# Patient Record
Sex: FEMALE | Race: WHITE | Employment: UNEMPLOYED | URBAN - METROPOLITAN AREA
[De-identification: names, ages, dates, MRNs, and addresses within clinical notes are randomized per-mention and may not be internally consistent; named-entity substitution may affect disease eponyms.]

---

## 2018-04-20 ENCOUNTER — HOSPITAL ENCOUNTER (OUTPATIENT)
Dept: ULTRASOUND IMAGING | Age: 53
Discharge: HOME | End: 2018-04-20
Attending: NURSE PRACTITIONER
Payer: COMMERCIAL

## 2018-04-20 DIAGNOSIS — E03.1 CONGENITAL HYPOTHYROIDISM WITHOUT GOITER: ICD-10-CM

## 2018-04-20 PROCEDURE — 76536 US EXAM OF HEAD AND NECK: CPT

## 2018-04-30 NOTE — PROGRESS NOTES
Patient having throat pain. Will refer to endocrinologist.  Already on levothyroxine and numbers good.   [facility]

## 2018-05-25 PROBLEM — E04.2 MULTINODULAR GOITER: Status: ACTIVE | Noted: 2018-05-25

## 2020-04-30 ENCOUNTER — ANESTHESIA EVENT (OUTPATIENT)
Dept: ENDOSCOPY | Age: 55
End: 2020-04-30
Payer: COMMERCIAL

## 2020-04-30 RX ORDER — SODIUM CHLORIDE 0.9 % (FLUSH) 0.9 %
5-40 SYRINGE (ML) INJECTION EVERY 8 HOURS
Status: CANCELLED | OUTPATIENT
Start: 2020-04-30

## 2020-04-30 RX ORDER — SODIUM CHLORIDE 0.9 % (FLUSH) 0.9 %
5-40 SYRINGE (ML) INJECTION AS NEEDED
Status: CANCELLED | OUTPATIENT
Start: 2020-04-30

## 2020-04-30 RX ORDER — SODIUM CHLORIDE, SODIUM LACTATE, POTASSIUM CHLORIDE, CALCIUM CHLORIDE 600; 310; 30; 20 MG/100ML; MG/100ML; MG/100ML; MG/100ML
100 INJECTION, SOLUTION INTRAVENOUS CONTINUOUS
Status: CANCELLED | OUTPATIENT
Start: 2020-04-30

## 2020-05-01 ENCOUNTER — HOSPITAL ENCOUNTER (OUTPATIENT)
Age: 55
Setting detail: OUTPATIENT SURGERY
Discharge: HOME | End: 2020-05-01
Attending: INTERNAL MEDICINE | Admitting: INTERNAL MEDICINE
Payer: COMMERCIAL

## 2020-05-01 ENCOUNTER — ANESTHESIA (OUTPATIENT)
Dept: ENDOSCOPY | Age: 55
End: 2020-05-01
Payer: COMMERCIAL

## 2020-05-01 VITALS
HEART RATE: 61 BPM | DIASTOLIC BLOOD PRESSURE: 79 MMHG | TEMPERATURE: 98.6 F | SYSTOLIC BLOOD PRESSURE: 130 MMHG | OXYGEN SATURATION: 98 % | RESPIRATION RATE: 18 BRPM

## 2020-05-01 LAB — GLUCOSE BLD STRIP.AUTO-MCNC: 101 MG/DL (ref 65–100)

## 2020-05-01 PROCEDURE — 74011250636 HC RX REV CODE- 250/636: Performed by: ANESTHESIOLOGY

## 2020-05-01 PROCEDURE — 76040000025: Performed by: INTERNAL MEDICINE

## 2020-05-01 PROCEDURE — 74011000250 HC RX REV CODE- 250: Performed by: NURSE ANESTHETIST, CERTIFIED REGISTERED

## 2020-05-01 PROCEDURE — 77030021593 HC FCPS BIOP ENDOSC BSC -A: Performed by: INTERNAL MEDICINE

## 2020-05-01 PROCEDURE — 74011250636 HC RX REV CODE- 250/636: Performed by: NURSE ANESTHETIST, CERTIFIED REGISTERED

## 2020-05-01 PROCEDURE — 88305 TISSUE EXAM BY PATHOLOGIST: CPT

## 2020-05-01 PROCEDURE — 82962 GLUCOSE BLOOD TEST: CPT

## 2020-05-01 PROCEDURE — 76060000032 HC ANESTHESIA 0.5 TO 1 HR: Performed by: INTERNAL MEDICINE

## 2020-05-01 RX ORDER — PROPOFOL 10 MG/ML
INJECTION, EMULSION INTRAVENOUS
Status: DISCONTINUED | OUTPATIENT
Start: 2020-05-01 | End: 2020-05-01 | Stop reason: HOSPADM

## 2020-05-01 RX ORDER — LIDOCAINE HYDROCHLORIDE 20 MG/ML
INJECTION, SOLUTION EPIDURAL; INFILTRATION; INTRACAUDAL; PERINEURAL AS NEEDED
Status: DISCONTINUED | OUTPATIENT
Start: 2020-05-01 | End: 2020-05-01 | Stop reason: HOSPADM

## 2020-05-01 RX ORDER — PROPOFOL 10 MG/ML
INJECTION, EMULSION INTRAVENOUS AS NEEDED
Status: DISCONTINUED | OUTPATIENT
Start: 2020-05-01 | End: 2020-05-01 | Stop reason: HOSPADM

## 2020-05-01 RX ORDER — SODIUM CHLORIDE 9 MG/ML
10 INJECTION, SOLUTION INTRAVENOUS CONTINUOUS
Status: DISCONTINUED | OUTPATIENT
Start: 2020-05-01 | End: 2020-05-01 | Stop reason: HOSPADM

## 2020-05-01 RX ORDER — SODIUM CHLORIDE, SODIUM LACTATE, POTASSIUM CHLORIDE, CALCIUM CHLORIDE 600; 310; 30; 20 MG/100ML; MG/100ML; MG/100ML; MG/100ML
100 INJECTION, SOLUTION INTRAVENOUS CONTINUOUS
Status: DISCONTINUED | OUTPATIENT
Start: 2020-05-01 | End: 2020-05-01 | Stop reason: HOSPADM

## 2020-05-01 RX ADMIN — PROPOFOL 10 MG: 10 INJECTION, EMULSION INTRAVENOUS at 12:41

## 2020-05-01 RX ADMIN — PROPOFOL 200 MCG/KG/MIN: 10 INJECTION, EMULSION INTRAVENOUS at 12:38

## 2020-05-01 RX ADMIN — PROPOFOL 20 MG: 10 INJECTION, EMULSION INTRAVENOUS at 12:40

## 2020-05-01 RX ADMIN — SODIUM CHLORIDE, SODIUM LACTATE, POTASSIUM CHLORIDE, AND CALCIUM CHLORIDE 100 ML/HR: 600; 310; 30; 20 INJECTION, SOLUTION INTRAVENOUS at 12:00

## 2020-05-01 RX ADMIN — LIDOCAINE HYDROCHLORIDE 40 MG: 20 INJECTION, SOLUTION EPIDURAL; INFILTRATION; INTRACAUDAL; PERINEURAL at 12:38

## 2020-05-01 RX ADMIN — PROPOFOL 50 MG: 10 INJECTION, EMULSION INTRAVENOUS at 12:38

## 2020-05-01 NOTE — OP NOTES
COLONOSCOPY    DATE of PROCEDURE: 5/1/2020    INDICATION: Screening    POSTPROCEDURE DIAGNOSIS: Colon Lipoma    MEDICATION:   MAC anesthesia (see anesthesia report)    INSTRUMENT: [id number]    PROCEDURE: After obtaining informed consent, the patient was placed in the left lateral position and sedated. The endoscope was advanced to the cecum where the appendiceal orifice and ileocecal valve were identified. On withdrawal, the colon was carefully visualized in a 360 degree fashion, looking between folds and proximal and distal aspect of the folds. The patient was taken to the recovery area in stable condition.     FINDINGS:  Rectum: hemorrhoids  Sigmoid: normal  Descending Colon: normal  Transverse Colon: normal  Ascending Colon: 2-3cm Lipoma located in the area of the hepatic flexure  Cecum: normal  Terminal ileum: not entered    Bowel Prep: Good  Estimated blood loss: 0-minimal   Specimens obtained during procedure: none    ASSESSMENT/PLAN: repeat colo 10y

## 2020-05-01 NOTE — ANESTHESIA PREPROCEDURE EVALUATION
Relevant Problems   No relevant active problems       Anesthetic History     PONV          Review of Systems / Medical History  Patient summary reviewed and pertinent labs reviewed    Pulmonary        Sleep apnea: No treatment           Neuro/Psych         Psychiatric history     Cardiovascular    Hypertension: well controlled          Hyperlipidemia    Exercise tolerance: >4 METS     GI/Hepatic/Renal     GERD: well controlled           Endo/Other    Diabetes: well controlled, type 2  Hypothyroidism: well controlled  Morbid obesity     Other Findings              Physical Exam    Airway  Mallampati: II  TM Distance: 4 - 6 cm  Neck ROM: normal range of motion   Mouth opening: Normal     Cardiovascular    Rhythm: regular  Rate: normal         Dental    Dentition: Full upper dentures     Pulmonary  Breath sounds clear to auscultation               Abdominal         Other Findings            Anesthetic Plan    ASA: 3  Anesthesia type: total IV anesthesia            Anesthetic plan and risks discussed with: Patient

## 2020-05-01 NOTE — PROGRESS NOTES
's pre-procedure visit and prayer with patient as requested.     Finn Phan MDiv, BS  Board Certified

## 2020-05-01 NOTE — H&P
[facility] H&P (Admission)        Date:  5/1/2020    Primary GI Physician: Adilene    Chief Complaint:  Barretts    Subjective:     History of Present Illness:  Patient is a 53 y.o. female with PMH of Barretts , who is being admitted for surveillance EGD and Colonoscopy. PMH:  Past Medical History:   Diagnosis Date    Acute sinusitis 4/17/2014    Arthritis     Depression     medication    Diabetes ([address]     Metformin daily , states does not check BS at home    LAUREANO (generalized anxiety disorder) 4/17/2014    GERD (gastroesophageal reflux disease)     controlled with medication    Headache(784.0) 4/17/2014    HTN (hypertension) 4/17/2014    Hypertension     managed with medication    Hypothyroidism 4/17/2014    Migraine     medication as needed    Nausea & vomiting     Otitis media 4/17/2014    Thyroid disease     hypothyroid- medication    Total knee replacement status 12/6/2011    Unspecified sleep apnea     does not wear c-pap       PSH:  Past Surgical History:   Procedure Laterality Date    HX BREAST BIOPSY Left 1999    HX CHOLECYSTECTOMY  1997    HX KNEE REPLACEMENT Left 2011    HX ORTHOPAEDIC  1992    carpal mikala -bilateral    HX ORTHOPAEDIC  2002    left knee arthroscopy    HX TONSILLECTOMY  1993    HX TUBAL LIGATION  1998       Allergies: Allergies   Allergen Reactions    Lortab [Hydrocodone-Acetaminophen] Nausea and Vomiting       Home Medications:  Prior to Admission medications    Medication Sig Start Date End Date Taking? Authorizing Provider   DULoxetine (CYMBALTA) 60 mg capsule Take 60 mg by mouth daily. Yes Provider, Historical   chlorzoxazone (PARAFON FORTE) 500 mg tablet Take 500 mg by mouth two (2) times a day. Take 1-1.5 tab twice a day   Yes Provider, Historical   etodolac (LODINE) 400 mg tablet Take  by mouth two (2) times daily (with meals). Yes Provider, Historical   gabapentin (NEURONTIN) 100 mg capsule Take 300 mg by mouth nightly.    Yes Provider, Historical   lisinopril (PRINIVIL, ZESTRIL) 10 mg tablet Take 10 mg by mouth daily. Yes Provider, Historical   metFORMIN (GLUCOPHAGE) 500 mg tablet Take 500 mg by mouth daily. Yes Provider, Historical   SUMAtriptan (IMITREX) 100 mg tablet Take 100 mg by mouth once as needed for Migraine. Yes Provider, Historical   levothyroxine (SYNTHROID) 125 mcg tablet Take 1 Tab by mouth Daily (before breakfast). 12/2/19  Yes Russell Cox MD   omeprazole (PRILOSEC) 40 mg capsule TAKE ONE CAPSULE BY MOUTH ONCE DAILY. 1/18/19  Yes Annabelle Aldridge NP       Hospital Medications:  Current Facility-Administered Medications   Medication Dose Route Frequency    lactated Ringers infusion  100 mL/hr IntraVENous CONTINUOUS    0.9% sodium chloride infusion  10 mL/hr IntraVENous CONTINUOUS    famotidine (PF) (PEPCID) 20 mg in 0.9% sodium chloride 10 mL injection  20 mg IntraVENous ONCE       Social History:  Social History     Tobacco Use    Smoking status: Never Smoker    Smokeless tobacco: Never Used   Substance Use Topics    Alcohol use: No       Pt denies any history of drug use, tattoos, or blood transfusions. Family History:  Family History   Problem Relation Age of Onset    Diabetes Mother     Lung Disease Mother     Heart Disease Mother     Hypertension Mother     Diabetes Father     Heart Disease Father     Cancer Father     Hypertension Father     Diabetes Sister     Heart Disease Sister     Hypertension Sister     Thyroid Disease Sister         3 out of 5 sisters have hypothyroidism    Cancer Maternal Grandmother     Breast Cancer Neg Hx     Thyroid Cancer Neg Hx        Review of Systems:  A detailed 10 system ROS is obtained, with pertinent positives as listed above. All others are negative.     Objective:     Physical Exam:  Vitals:  Visit Vitals  /63   Pulse 67   Temp 98.1 °F (36.7 °C)   Resp 18   LMP  (LMP Unknown)   SpO2 96%     Gen:  Pt is alert, cooperative, no acute distress  Skin:  Extremities and face reveal no rashes. HEENT: Sclerae anicteric. Extra-occular muscles are intact. No oral ulcers. No abnormal pigmentation of the lips. The neck is supple. Cardiovascular: Regular rate and rhythm. No murmurs, gallops, or rubs. Respiratory:  Comfortable breathing with no accessory muscle use. Clear breath sounds with no wheezes, rales, or rhonchi. GI:  Abdomen nondistended, soft, and nontender. Normal active bowel sounds. No enlargement of the liver or spleen. No masses palpable. Rectal:  Deferred  Musculoskeletal:  No pitting edema of the lower legs. Neurological:  Gross memory appears intact. Patient is alert and oriented. Psychiatric:  Mood appears appropriate with judgement intact. Lymphatic:  No cervical or supraclavicular adenopathy. Laboratory:    No results for input(s): WBC, HGB, HCT, PLT, MCV, NA, K, CL, CO2, BUN, CREA, CA, MG, GLU, AP, SGOT, GPT, ALT, TBIL, TBILI, CBIL, ALB, TP, AML, LPSE, PTP, INR, APTT, HGBEXT, HCTEXT, PLTEXT, INREXT in the last 72 hours. No lab exists for component: DBIL    Assessment:       Active Problems:    * No active hospital problems. *      Plan:       Barrets esophagus/ CRC screening-  Plan EGD colo.

## 2020-05-01 NOTE — DISCHARGE INSTRUCTIONS
Gastrointestinal Esophagogastroduodenoscopy (EGD) - Upper Exam Discharge Instructions    1. Call Dr. Mary Stein at [phone] for any problems or questions. 2. Contact the doctor's office for follow up appointment as directed. 3. Medication may cause drowsiness for several hours, therefore, do not drive or operate machinery for remainder of the day. Do not make any legal decisions today. 4. No alcohol today. 5. Ordinarily, you may resume regular diet and activity after exam unless otherwise specified by your physician. 6. For mild soreness in your throat you may use Cepacol throat lozenges or warm salt-water gargles as needed. Gastrointestinal Colonoscopy/Flexible Sigmoidoscopy - Lower Exam Discharge Instructions  1. Because of air put into your colon during exam, you may experience some abdominal distension, relieved by the passage of gas, for several hours. 2. Contact your physician if you have any of the following:  a. Excessive amount of bleeding - large amount when having a bowel movement. Occasional specks of blood with bowel movement would not be unusual.  b. Severe abdominal pain  c.  Fever or Chills    Any additional instructions:   Continue your reflux medications  Repeat colonoscopy in 10 years   Follow-up with pathology results

## 2020-05-01 NOTE — ANESTHESIA POSTPROCEDURE EVALUATION
Procedure(s):  ESOPHAGOGASTRODUODENOSCOPY (EGD)  COLONOSCOPY/ BMI 51  ESOPHAGOGASTRODUODENAL (EGD) BIOPSY. total IV anesthesia    Anesthesia Post Evaluation      Multimodal analgesia: multimodal analgesia used between 6 hours prior to anesthesia start to PACU discharge  Patient location during evaluation: PACU  Patient participation: complete - patient participated  Level of consciousness: awake  Pain management: adequate  Airway patency: patent  Anesthetic complications: no  Cardiovascular status: acceptable and hemodynamically stable  Respiratory status: acceptable  Hydration status: acceptable  Comments: Acceptable for discharge from PACU. Post anesthesia nausea and vomiting:  none      Vitals Value Taken Time   /72 5/1/2020  1:06 PM   Temp 37 °C (98.6 °F) 5/1/2020  1:06 PM   Pulse 64 5/1/2020  1:11 PM   Resp 18 5/1/2020  1:06 PM   SpO2 95 % 5/1/2020  1:11 PM   Vitals shown include unvalidated device data.

## 2020-05-01 NOTE — OP NOTES
Esophagogastroduodenoscopy    DATE of PROCEDURE: 5/1/2020    INDICATION: Barretts    POSTPROCEDURE DIAGNOSIS:Barretts, Hiatal hernia, Camerons erosions    MEDICATIONS ADMINISTERED: MAC anesthesia (see anesthesia report)    INSTRUMENT: [id number]    PROCEDURE:  After obtaining informed consent, the patient was placed in the left lateral position and sedated. The endoscope was advanced under direct vision without difficulty. The esophagus, stomach (including retroflexed views) and duodenum were evaluated. The patient was taken to the recovery area in stable condition.     FINDINGS:  ESOPHAGUS:Irregular Z line biopsied for history of Barretts  STOMACH:Large hiatal hernia, shallow camerons erosions  DUODENUM:  Normal    Estimated blood loss: 0-minimal   Specimens obtained during procedure: none    PLAN: Continue PPI, Follow path

## 2021-12-06 PROBLEM — E06.3 HASHIMOTO'S THYROIDITIS: Status: ACTIVE | Noted: 2021-12-06

## 2022-03-19 PROBLEM — E06.3 HASHIMOTO'S THYROIDITIS: Status: ACTIVE | Noted: 2021-12-06

## 2022-03-19 PROBLEM — E04.2 MULTINODULAR GOITER: Status: ACTIVE | Noted: 2018-05-25

## 2022-09-20 ENCOUNTER — TELEPHONE (OUTPATIENT)
Dept: ENDOCRINOLOGY | Age: 57
End: 2022-09-20

## 2022-09-20 DIAGNOSIS — E04.2 MULTINODULAR GOITER: Primary | ICD-10-CM

## 2022-09-20 NOTE — TELEPHONE ENCOUNTER
Patient stated that she has been having throat pain for the past couple of months. She states that her throat feels full. Her pcp instructed her to contact us. Patient said that she feels her follow up appointment in December is too far. She would like to know what Dr. Gunjan Li recommends.

## 2022-09-30 ENCOUNTER — HOSPITAL ENCOUNTER (OUTPATIENT)
Dept: ULTRASOUND IMAGING | Age: 57
Discharge: HOME | End: 2022-10-02
Payer: COMMERCIAL

## 2022-09-30 DIAGNOSIS — E04.2 MULTINODULAR GOITER: ICD-10-CM

## 2022-09-30 PROCEDURE — 76536 US EXAM OF HEAD AND NECK: CPT

## 2022-12-06 ENCOUNTER — OFFICE VISIT (OUTPATIENT)
Dept: ENDOCRINOLOGY | Age: 57
End: 2022-12-06
Payer: COMMERCIAL

## 2022-12-06 VITALS — BODY MASS INDEX: 51.73 KG/M2 | DIASTOLIC BLOOD PRESSURE: 86 MMHG | SYSTOLIC BLOOD PRESSURE: 110 MMHG | WEIGHT: 292 LBS

## 2022-12-06 DIAGNOSIS — E03.9 PRIMARY HYPOTHYROIDISM: ICD-10-CM

## 2022-12-06 DIAGNOSIS — E06.3 HASHIMOTO'S THYROIDITIS: ICD-10-CM

## 2022-12-06 DIAGNOSIS — E04.2 MULTINODULAR GOITER: Primary | ICD-10-CM

## 2022-12-06 PROCEDURE — 3074F SYST BP LT 130 MM HG: CPT | Performed by: INTERNAL MEDICINE

## 2022-12-06 PROCEDURE — 99214 OFFICE O/P EST MOD 30 MIN: CPT | Performed by: INTERNAL MEDICINE

## 2022-12-06 PROCEDURE — 3078F DIAST BP <80 MM HG: CPT | Performed by: INTERNAL MEDICINE

## 2022-12-06 RX ORDER — LEVOTHYROXINE SODIUM 0.12 MG/1
125 TABLET ORAL
Qty: 90 TABLET | Refills: 3 | Status: SHIPPED | OUTPATIENT
Start: 2022-12-06

## 2022-12-06 ASSESSMENT — ENCOUNTER SYMPTOMS
DIARRHEA: 0
ROS SKIN COMMENTS: DENIES HAIR LOSS, DRY SKIN.
CONSTIPATION: 0

## 2022-12-06 NOTE — PROGRESS NOTES
Saurav Govea MD, [facility] Endocrinology and Thyroid Nodule [facility]  [address], [address] [address]  Phone [phone]  Facsimile [phone]          Janny Landry is a 61 y.o. female seen 12/6/2022  for follow up of multinodular goiter        ASSESSMENT AND PLAN:    1. Multinodular goiter  Status post negative FNA biopsy of the hypoechoic, ill-defined mid left lobe nodule and inferior-lateral left lobe nodule 5/2018. Thyroid ultrasound 9/2022 revealed that the nodules were stable in size and appearance. I will have her return for a follow-up ultrasound in one year to document stability. 2. Primary hypothyroidism  She was biochemically euthyroid 8/2022. She states that her primary care physician is planning to repeat her labs in 2/2023. She is currently clinically euthyroid and I therefore see no need to repeat her labs today. - levothyroxine (SYNTHROID) 125 mcg tablet; Take 1 Tablet by mouth Daily (before breakfast). Dispense: 90 Tablet; Refill: 3     3. Hashimoto's thyroiditis  Based on her thyroid ultrasound appearance and positive thyroid peroxidase antibodies, she has Hashimoto's thyroiditis. Follow-up and Dispositions    Return in about 1 year (around 12/6/2023). HISTORY OF PRESENT ILLNESS:    THYROID NODULE / MULTINODULAR GOITER     Presentation: Neck ultrasound ordered during the evaluation of left neck pain. Thyroid Cancer Risk Factors: There is no family history of thyroid cancer. There is no history of radiation to the head/neck. Symptoms:  Denies anterior neck enlargement/pain/pressure. Denies dysphagia, positional shortness of breath, hoarseness. Imaging:  Thyroid ultrasound 4/20/2018: Right lobe 5.7 x 2.8 x 2.3 cm, heterogeneous echotexture, no nodules. Left lobe 6.3 x 3.0 x 4.0 cm. In the inferior left lobe laterally there is a solid, mildly hyperechoic nodule measuring 1.4 x 1.7 x 1.3 cm.   In the superior left lobe there is a hypoechoic nodule measuring 1.2 x 1.0 x 1.0 cm with indistinct margins and coarse calcifications. Isthmus measures 1.0 cm. Limited thyroid ultrasound 5/25/2018: In the inferior right lobe there is a solid, fairly isoechoic and mildly heterogeneous nodule measuring 0.58 x 0.76 x 1.25 cm. In another view it appears like it could be part of a larger nodule measuring 0.78 x 1.32 x 1.65 cm. It does not contain microcalcifications. In the mid left lobe there is a solid, hypoechoic and ill-defined nodule measuring approximately 1.22 x 1.31 cm. It appears to contain some calcifications but no internal blood flow. In the inferior left lobe laterally there is a solid, hyperechoic nodule measuring 1.40 x 1.36 x 1.74 cm containing a central cystic focus but no microcalcifications. FNA biopsy mid left lobe nodule 5/25/2018: Benign. FNA biopsy inferior-lateral left lobe nodule 5/25/2018: Benign. Thyroid ultrasound 11/26/2018: Right lobe 2.64 x 2.62 x 5.89 cm, markedly heterogeneous echotexture. In the inferior right lobe there is a mostly solid, heterogeneous measuring 0.90 x 1.61 x 1.55 cm containing mostly peripheral blood flow and no obvious microcalcifications. Isthmus measures 0.96 cm. Left lobe 2.74 x 3.37 x 5.69 cm, markedly heterogeneous echotexture. In the mid left lobe there is a solid, hypoechoic and ill-defined nodule measuring 1.20 x 1.24 cm containing no internal blood flow. It does appear to possibly contain some calcifications. In the inferior left lobe laterally there is a solid, mildly hyperechoic nodule measuring 1.24 x 1.26 x 1.80 cm containing mostly peripheral blood flow and no obvious microcalcifications. Thyroid ultrasound 12/2/2019: Right lobe 2.49 x 2.30 x 5.33 cm, heterogeneous echotexture. In the inferior right lobe there is a mostly solid, heterogeneous nodule measuring 1.04 x 1.37 x 1.72 cm containing no microcalcifications. Isthmus measures 0.98 cm.   Left lobe 2.63 x 3.24 x 7.26 cm, heterogeneous echotexture. In the mid left lobe there is a solid, hypoechoic and ill-defined nodule measuring 1.00 x 1.22 cm containing no internal blood flow. It does appear to possibly contain some calcifications. In the inferior left lobe laterally there is a solid, hyperechoic nodule measuring 1.10 x 1.13 x 1.81 cm containing no microcalcifications. Thyroid ultrasound 12/4/2020: Right lobe 2.59 x 2.35 x 5.24 cm, heterogeneous echotexture. In the inferior right lobe there is a solid, heterogeneous nodule measuring 0.98 x 1.14 x 1.55 cm without microcalcifications. In the inferior right lobe anteriorly there is a solid isoechoic nodule measuring 0.66 x 1.26 x 1.18 cm without microcalcifications. Isthmus measures 1.24 cm. Left lobe 2.83 x 3.12 x 7.08 cm, heterogeneous echotexture. In the mid left lobe there is a solid, hypoechoic ill-defined nodule measuring 1.12 x 1.29 cm with no internal blood flow. It does appear to possibly contain some calcifications. Just laterally is a solid isoechoic nodule measuring 0.69 x 1.14 x 1.24 cm without microcalcifications. In the inferior left lobe laterally there is a solid, isoechoic nodule measuring 1.18 x 1.15 x 1.63 cm without microcalcifications. Thyroid ultrasound 12/6/2021: Right lobe 2.32 x 2.13 x 5.36 cm, heterogeneous echotexture. In the inferior right lobe there is a complex, predominantly solid isoechoic nodule measuring 0.97 x 1.10 x 1.61 cm without microcalcifications (TR 3). Isthmus measures 1.13 cm. Left lobe 2.12 x 2.46 x 5.52 cm, heterogeneous echotexture. In the mid left lobe there is a solid hypoechoic nodule with ill-defined borders measuring 1.00 x 1.26 x 1.46 cm which possibly contains some punctate echogenic foci (TR 5). Just laterally is a solid isoechoic nodule measuring 0.69 x 1.02 x 1.25 cm without microcalcifications (TR 3).   In the inferior left lobe there is a solid isoechoic nodule measuring 1.13 x 1.16 x 1.65 cm without microcalcifications (TR 3). Thyroid ultrasound 9/30/2022 ([facility]): The thyroid is diffusely enlarged, heterogeneous and hyperemic. Right lobe measures 6.2 x 1.9 x 2.6 cm. Left lobe measures 6.8 x 3.2 x 3.0 cm. Isthmus measures 0.8 cm. There are few scattered calcifications within the thyroid gland. In the inferior left lobe there is a solid isoechoic nodule measuring 1.8 x 1.3 x 1.2 cm (TR 3). Previously seen additional left lobe nodule has decreased in size, now measuring 0.5 cm and associated with a small calcification. THYROID DISEASE     Presentation/Diagnosis: Hypothyroidism diagnosed around 2003. Symptoms: See review of systems. Treatment: Takes generic in AM correctly. Labs:  10/20/2017: TSH 3.420.  4/13/2018: TSH 1.070.  9/21/2018: TSH 0.297.  1/18/2019: TSH 0.753.  7/12/2019: TSH 0.658.  4/28/2020: TSH 0.848.  12/4/2020: TSH 1.140, thyroid peroxidase antibodies 306.  8/27/2021: TSH 1.754.  12/6/2021: TSH 2.740.  2/4/2022: TSH 1.929.  8/2/2022: TSH 0.849. Review of Systems   Constitutional:  Positive for fatigue (mild). Negative for diaphoresis. Weight decreased 8 pounds since last visit. Cardiovascular:  Negative for palpitations. Gastrointestinal:  Negative for constipation and diarrhea. Endocrine: Negative for cold intolerance and heat intolerance. Skin:         Denies hair loss, dry skin. Neurological:  Negative for tremors. Vital Signs:  /86 (Site: Left Upper Arm, Position: Sitting)   Wt 292 lb (132.5 kg)   BMI 51.73 kg/m²     Wt Readings from Last 3 Encounters:   12/06/22 292 lb (132.5 kg)   12/06/21 300 lb (136.1 kg)       Physical Exam  Constitutional:       General: She is not in acute distress. Neck:      Thyroid: No thyroid mass or thyromegaly. Cardiovascular:      Rate and Rhythm: Normal rate and regular rhythm. Lymphadenopathy:      Cervical: No cervical adenopathy. Neurological:      Motor: No tremor. No orders of the defined types were placed in this encounter. Current Outpatient Medications   Medication Sig Dispense Refill    levothyroxine (SYNTHROID) 125 MCG tablet Take 1 tablet by mouth every morning (before breakfast) 90 tablet 3    etodolac (LODINE) 400 MG tablet Take by mouth 2 times daily (with meals)      FLUoxetine HCl, PMDD, 20 MG TABS TAKE 3 TABLETS BY MOUTH EVERY DAY      ibuprofen (ADVIL;MOTRIN) 200 MG tablet Take 200 mg by mouth as needed      lisinopril (PRINIVIL;ZESTRIL) 10 MG tablet Take 10 mg by mouth daily      omeprazole (PRILOSEC) 40 MG delayed release capsule TAKE ONE CAPSULE BY MOUTH ONCE DAILY. polyethylene glycol (GLYCOLAX) 17 GM/SCOOP powder Take 17 g by mouth daily as needed      SUMAtriptan (IMITREX) 100 MG tablet Take 100 mg by mouth once as needed       No current facility-administered medications for this visit.

## 2023-03-31 ENCOUNTER — OFFICE VISIT (OUTPATIENT)
Dept: ORTHOPEDIC SURGERY | Age: 58
End: 2023-03-31

## 2023-03-31 VITALS — HEIGHT: 63 IN | BODY MASS INDEX: 51.91 KG/M2 | WEIGHT: 293 LBS

## 2023-03-31 DIAGNOSIS — G56.22 ULNAR NEUROPATHY OF LEFT UPPER EXTREMITY: Primary | ICD-10-CM

## 2023-03-31 DIAGNOSIS — M54.12 CERVICAL RADICULOPATHY: ICD-10-CM

## 2023-03-31 NOTE — PROGRESS NOTES
What Type Of Note Output Would You Prefer (Optional)?: Bullet Format How Severe Is Your Skin Lesion?: moderate Has Your Skin Lesion Been Treated?: not been treated possible cubital tunnel syndrome. I do not see any etiology for ulnar paresthesias on the cervical MRI. The foraminal stenosis at C6-C7 would be expected to cause paresthesias in the index and middle fingers but not the ring and small fingers. This is contradictory to the patient's symptoms. The patient has no suggestion of cervical myelopathy on exam.  Therefore, I do not feel that a cervical spine surgery would be beneficial in addressing her ulnar paresthesias in the left arm. Likewise, I do not feel that the central stenosis is severe enough to warrant a cervical spine surgery. At this point, I would recommend continued symptomatic care.           Electronically Signed By Rashawn Stafford MD     10:42 AM Is This A New Presentation, Or A Follow-Up?: Skin Lesions

## 2023-11-13 ENCOUNTER — OFFICE VISIT (OUTPATIENT)
Dept: ORTHOPEDIC SURGERY | Age: 58
End: 2023-11-13
Payer: COMMERCIAL

## 2023-11-13 DIAGNOSIS — Z96.652 HISTORY OF TOTAL KNEE ARTHROPLASTY, LEFT: Primary | ICD-10-CM

## 2023-11-13 DIAGNOSIS — M17.11 OSTEOARTHRITIS OF RIGHT KNEE, UNSPECIFIED OSTEOARTHRITIS TYPE: ICD-10-CM

## 2023-11-13 DIAGNOSIS — M25.561 RIGHT KNEE PAIN, UNSPECIFIED CHRONICITY: ICD-10-CM

## 2023-11-13 PROCEDURE — 99204 OFFICE O/P NEW MOD 45 MIN: CPT | Performed by: PHYSICIAN ASSISTANT

## 2023-11-13 PROCEDURE — 20611 DRAIN/INJ JOINT/BURSA W/US: CPT | Performed by: PHYSICIAN ASSISTANT

## 2023-11-13 RX ORDER — TRIAMCINOLONE ACETONIDE 40 MG/ML
40 INJECTION, SUSPENSION INTRA-ARTICULAR; INTRAMUSCULAR ONCE
Status: COMPLETED | OUTPATIENT
Start: 2023-11-13 | End: 2023-11-13

## 2023-11-13 RX ADMIN — TRIAMCINOLONE ACETONIDE 40 MG: 40 INJECTION, SUSPENSION INTRA-ARTICULAR; INTRAMUSCULAR at 11:21

## 2023-11-13 NOTE — PROGRESS NOTES
file   Physical Activity: Not on file   Stress: Not on file   Social Connections: Not on file   Intimate Partner Violence: Not on file   Housing Stability: Not on file       Review of Systems:  As per HPI. Pertinent positives and negatives are addressed with the patient, particularly those related to musculoskeletal concerns. Non-orthopaedic concerns were referred back to the primary care physician. PHYSICAL EXAMINATION:   The patient appears their stated age and they are in no distress. The lower extremities are as described below. Circulation is normal with palpable pedal pulses bilaterally and no edema. There is no lymph adenopathy in the popliteal or malleolar region. The skin is without stasis disease distally bilaterally. Hip ROM is smooth and painless. Knee range of motion is 0-120 degrees on the right and 0-120 degrees on the left. bilateral knee: There is 2mm of anterior/posterior translation and 2mm of medial/lateral instability bilaterally. There is 2 degrees of varus alignment in the bilateral knee. There is some pain to palpation over the medial joint line. Limb lengths are equal.  The gait is noted to be with a slight trendelenburg and antalgia. Straight leg test is negative. Quadriceps strength is good. Sensation is intact to light touch bilaterally. Their judgment and insight are normal.  They are oriented to time, place and person. Their memory is good and the mood and affect appropriate. X-RAY: Views of the bilateral knee are reviewed. 4 views standing reveal joint space loss, eburnated bone, and osteophyte formation present. X-ray impression:  Advanced degenerative joint disease of right knee, 3 views standing reveal good bone prosthetic interface with no suggestion of progressive lucency. X-ray impression:  Stable left total knee arthroplasty    IMPRESSION:    Diagnosis Orders   1. History of total knee arthroplasty, left  XR Knee Bilateral Standard Extended VW      2.

## 2023-12-06 ENCOUNTER — OFFICE VISIT (OUTPATIENT)
Dept: ENDOCRINOLOGY | Age: 58
End: 2023-12-06
Payer: COMMERCIAL

## 2023-12-06 VITALS
DIASTOLIC BLOOD PRESSURE: 85 MMHG | HEART RATE: 75 BPM | SYSTOLIC BLOOD PRESSURE: 130 MMHG | BODY MASS INDEX: 50.94 KG/M2 | OXYGEN SATURATION: 99 % | WEIGHT: 283 LBS

## 2023-12-06 DIAGNOSIS — E06.3 HASHIMOTO'S THYROIDITIS: ICD-10-CM

## 2023-12-06 DIAGNOSIS — E04.2 MULTINODULAR GOITER: Primary | ICD-10-CM

## 2023-12-06 DIAGNOSIS — E03.9 PRIMARY HYPOTHYROIDISM: ICD-10-CM

## 2023-12-06 PROCEDURE — 76536 US EXAM OF HEAD AND NECK: CPT | Performed by: INTERNAL MEDICINE

## 2023-12-06 PROCEDURE — 3079F DIAST BP 80-89 MM HG: CPT | Performed by: INTERNAL MEDICINE

## 2023-12-06 PROCEDURE — 3075F SYST BP GE 130 - 139MM HG: CPT | Performed by: INTERNAL MEDICINE

## 2023-12-06 PROCEDURE — 99214 OFFICE O/P EST MOD 30 MIN: CPT | Performed by: INTERNAL MEDICINE

## 2023-12-06 RX ORDER — ALBUTEROL SULFATE 90 UG/1
2 AEROSOL, METERED RESPIRATORY (INHALATION) EVERY 4 HOURS PRN
COMMUNITY
Start: 2023-08-17

## 2023-12-06 RX ORDER — LEVOTHYROXINE SODIUM 0.12 MG/1
125 TABLET ORAL
Qty: 90 TABLET | Refills: 3
Start: 2023-12-06

## 2023-12-06 RX ORDER — CYCLOBENZAPRINE HCL 10 MG
5-10 TABLET ORAL 3 TIMES DAILY PRN
COMMUNITY
Start: 2022-11-11

## 2023-12-06 RX ORDER — SEMAGLUTIDE 2.68 MG/ML
2 INJECTION, SOLUTION SUBCUTANEOUS
COMMUNITY
Start: 2023-07-20

## 2023-12-06 RX ORDER — METFORMIN HYDROCHLORIDE 750 MG/1
750 TABLET, EXTENDED RELEASE ORAL
COMMUNITY
Start: 2023-03-08

## 2023-12-06 ASSESSMENT — ENCOUNTER SYMPTOMS
ROS SKIN COMMENTS: DENIES HAIR LOSS, DRY SKIN.
CONSTIPATION: 0
DIARRHEA: 0

## 2023-12-06 NOTE — PROGRESS NOTES
extended release tablet Take 1 tablet by mouth daily (with breakfast)      ROSUVASTATIN CALCIUM PO       levothyroxine (SYNTHROID) 125 MCG tablet Take 1 tablet by mouth every morning (before breakfast) 90 tablet 3    etodolac (LODINE) 400 MG tablet Take by mouth 2 times daily (with meals)      FLUoxetine HCl, PMDD, 20 MG TABS TAKE 3 TABLETS BY MOUTH EVERY DAY      ibuprofen (ADVIL;MOTRIN) 200 MG tablet Take 1 tablet by mouth as needed      lisinopril (PRINIVIL;ZESTRIL) 10 MG tablet Take 1 tablet by mouth daily      omeprazole (PRILOSEC) 40 MG delayed release capsule TAKE ONE CAPSULE BY MOUTH ONCE DAILY. polyethylene glycol (GLYCOLAX) 17 GM/SCOOP powder Take 17 g by mouth daily as needed      SUMAtriptan (IMITREX) 100 MG tablet Take 1 tablet by mouth once as needed       No current facility-administered medications for this visit.

## 2023-12-11 ENCOUNTER — OFFICE VISIT (OUTPATIENT)
Dept: ORTHOPEDIC SURGERY | Age: 58
End: 2023-12-11
Payer: COMMERCIAL

## 2023-12-11 DIAGNOSIS — M17.11 OSTEOARTHRITIS OF RIGHT KNEE, UNSPECIFIED OSTEOARTHRITIS TYPE: Primary | ICD-10-CM

## 2023-12-11 PROCEDURE — 20611 DRAIN/INJ JOINT/BURSA W/US: CPT | Performed by: PHYSICIAN ASSISTANT

## 2023-12-11 PROCEDURE — 99999 PR OFFICE/OUTPT VISIT,PROCEDURE ONLY: CPT | Performed by: PHYSICIAN ASSISTANT

## 2023-12-11 RX ORDER — HYALURONATE SODIUM 10 MG/ML
20 SYRINGE (ML) INTRAARTICULAR ONCE
Status: COMPLETED | OUTPATIENT
Start: 2023-12-11 | End: 2023-12-11

## 2023-12-11 RX ADMIN — Medication 20 MG: at 09:01

## 2023-12-11 NOTE — PROGRESS NOTES
Name: Bisi Watts  YOB: 1965  Gender: female  MRN: [id number]      CC: Right Knee Pain     PROCEDURE: 1 of 3     DIAGNOSIS:    Encounter Diagnosis   Name Primary? Osteoarthritis of right knee, unspecified osteoarthritis type Yes       Zeetl US unit with variable frequency (6.0-15.0 MHz) linear transducer was used visualize the intracondylar notch, retropatellar fat pad, patella tendon, patella, tibia, and to ensure proper intra-articular needle placement. Injection image was saved to patient's permanent chart. Procedure Note: The patient was placed in upright position with knee hanging freely from exam table. The right  knee was prepped in sterile fashion using alcohol wipe. Using Mindray ultrasound guidance, a 22 gauge needle was then introduced into the knee joint from an infrapatellar approach and  2 mL of Euflexxa was injected freely. The needle was then removed, pressure hemostatis achieved, injection site was cleansed with alcohol wipe and dressed with band aid. The patient tolerated the procedure without complication. The patient  will follow up as scheduled.

## 2023-12-13 ENCOUNTER — TELEPHONE (OUTPATIENT)
Dept: ORTHOPEDIC SURGERY | Age: 58
End: 2023-12-13

## 2023-12-13 NOTE — TELEPHONE ENCOUNTER
Patient calling regarding her prescription for the injections. The pharmacy has some questions regarding those.

## 2023-12-21 ENCOUNTER — TELEPHONE (OUTPATIENT)
Dept: ORTHOPEDIC SURGERY | Age: 58
End: 2023-12-21

## 2023-12-21 NOTE — TELEPHONE ENCOUNTER
Euflexxa should be received at [facility] office from [facility] specialty pharmacy by 12/28/23.

## 2023-12-28 ENCOUNTER — OFFICE VISIT (OUTPATIENT)
Dept: ORTHOPEDIC SURGERY | Age: 58
End: 2023-12-28
Payer: COMMERCIAL

## 2023-12-28 DIAGNOSIS — M17.11 OSTEOARTHRITIS OF RIGHT KNEE, UNSPECIFIED OSTEOARTHRITIS TYPE: Primary | ICD-10-CM

## 2023-12-28 PROCEDURE — 99999 PR OFFICE/OUTPT VISIT,PROCEDURE ONLY: CPT | Performed by: PHYSICIAN ASSISTANT

## 2023-12-28 PROCEDURE — 20611 DRAIN/INJ JOINT/BURSA W/US: CPT | Performed by: PHYSICIAN ASSISTANT

## 2023-12-28 RX ORDER — HYALURONATE SODIUM 10 MG/ML
20 SYRINGE (ML) INTRAARTICULAR ONCE
Status: COMPLETED | OUTPATIENT
Start: 2023-12-28 | End: 2023-12-28

## 2023-12-28 RX ADMIN — Medication 20 MG: at 14:47

## 2023-12-28 NOTE — PROGRESS NOTES
Name: Monse Fajardo  YOB: 1965  Gender: female  MRN: [id number]      CC: Right Knee Pain     PROCEDURE: 3 of 3 HP    DIAGNOSIS:    Encounter Diagnosis   Name Primary? Osteoarthritis of right knee, unspecified osteoarthritis type Yes       Augmedix US unit with variable frequency (6.0-15.0 MHz) linear transducer was used visualize the intracondylar notch, retropatellar fat pad, patella tendon, patella, tibia, and to ensure proper intra-articular needle placement. Injection image was saved to patient's permanent chart. Procedure Note: The patient was placed in upright position with knee hanging freely from exam table. The right  knee was prepped in sterile fashion using alcohol wipe. Using Mindray ultrasound guidance, a 22 gauge needle was then introduced into the knee joint from an infrapatellar approach and  2 mL of Euflexxa was injected freely. The needle was then removed, pressure hemostatis achieved, injection site was cleansed with alcohol wipe and dressed with band aid. The patient tolerated the procedure without complication. The patient reports moderate relief of right knee pain thus far and plans to return in 6 months to repeat HP or sooner for cortisone, if needed.

## 2024-02-19 ENCOUNTER — OFFICE VISIT (OUTPATIENT)
Dept: ORTHOPEDIC SURGERY | Age: 59
End: 2024-02-19
Payer: COMMERCIAL

## 2024-02-19 DIAGNOSIS — M17.11 OSTEOARTHRITIS OF RIGHT KNEE, UNSPECIFIED OSTEOARTHRITIS TYPE: Primary | ICD-10-CM

## 2024-02-19 PROCEDURE — 20611 DRAIN/INJ JOINT/BURSA W/US: CPT | Performed by: ORTHOPAEDIC SURGERY

## 2024-02-19 RX ORDER — TRIAMCINOLONE ACETONIDE 40 MG/ML
40 INJECTION, SUSPENSION INTRA-ARTICULAR; INTRAMUSCULAR ONCE
Status: COMPLETED | OUTPATIENT
Start: 2024-02-19 | End: 2024-02-19

## 2024-02-19 RX ADMIN — TRIAMCINOLONE ACETONIDE 40 MG: 40 INJECTION, SUSPENSION INTRA-ARTICULAR; INTRAMUSCULAR at 14:37

## 2024-02-19 NOTE — PROGRESS NOTES
patella, and tibia as well as to ensure adequate needle placement.  Injection image was obtained and placed in the patient's permanent chart.    Procedure Note: The right knee was prepped with alcohol. Then, under GE ultrasound guidance, the right knee was injected with 2 mL of 0.5% Marcaine and 40 mg of Kenalog. The patient tolerated the procedure without difficulty.    Disposition: They are to return as scheduled.

## 2024-04-15 ENCOUNTER — OFFICE VISIT (OUTPATIENT)
Dept: ORTHOPEDIC SURGERY | Age: 59
End: 2024-04-15
Payer: COMMERCIAL

## 2024-04-15 VITALS — BODY MASS INDEX: 52.67 KG/M2 | HEIGHT: 61 IN | WEIGHT: 279 LBS

## 2024-04-15 DIAGNOSIS — M17.11 OSTEOARTHRITIS OF RIGHT KNEE, UNSPECIFIED OSTEOARTHRITIS TYPE: Primary | ICD-10-CM

## 2024-04-15 PROCEDURE — 99213 OFFICE O/P EST LOW 20 MIN: CPT | Performed by: PHYSICIAN ASSISTANT

## 2024-04-15 NOTE — PROGRESS NOTES
Name: Sherri Salmeron  YOB: 1965  Gender: female  MRN: [id number]    CC:   Chief Complaint   Patient presents with    Follow-up     Right knee-discuss sx        HPI:  The patient returns today for follow-up on right knee pain.  She reports that cortisone injection she received on February 19 provided moderate relief but is beginning to wear off.  She is continue to work on weight loss measures with her PCP.  She has been taking Ozempic and reports losing approximately 26 pounds thus far.  She is also working with her PCP on diet modifications as well.  No other new complaints.    ROS:  As per HPI. Pertinent postives and negatives are addressed with the patient, particularly those related to musculoskeletal concerns.  Non-orthopaedic concerns were referred back to the primary care physician.      PMFSH:    Current Outpatient Medications:     OZEMPIC, 2 MG/DOSE, 8 MG/3ML SOPN, Inject 2 mg into the skin every 7 days, Disp: , Rfl:     albuterol sulfate HFA (PROVENTIL;VENTOLIN;PROAIR) 108 (90 Base) MCG/ACT inhaler, Inhale 2 puffs into the lungs every 4 hours as needed, Disp: , Rfl:     cyclobenzaprine (FLEXERIL) 10 MG tablet, Take 0.5-1 tablets by mouth 3 times daily as needed, Disp: , Rfl:     metFORMIN (GLUCOPHAGE-XR) 750 MG extended release tablet, Take 1 tablet by mouth daily (with breakfast), Disp: , Rfl:     ROSUVASTATIN CALCIUM PO, , Disp: , Rfl:     levothyroxine (SYNTHROID) 125 MCG tablet, Take 1 tablet by mouth every morning (before breakfast), Disp: 90 tablet, Rfl: 3    etodolac (LODINE) 400 MG tablet, Take by mouth 2 times daily (with meals), Disp: , Rfl:     FLUoxetine HCl, PMDD, 20 MG TABS, TAKE 3 TABLETS BY MOUTH EVERY DAY, Disp: , Rfl:     ibuprofen (ADVIL;MOTRIN) 200 MG tablet, Take 1 tablet by mouth as needed, Disp: , Rfl:     lisinopril (PRINIVIL;ZESTRIL) 10 MG tablet, Take 1 tablet by mouth daily, Disp: , Rfl:     omeprazole (PRILOSEC) 40 MG delayed release capsule, TAKE ONE

## 2024-05-29 ENCOUNTER — OFFICE VISIT (OUTPATIENT)
Dept: ORTHOPEDIC SURGERY | Age: 59
End: 2024-05-29
Payer: COMMERCIAL

## 2024-05-29 VITALS — HEIGHT: 61 IN | WEIGHT: 260 LBS | BODY MASS INDEX: 49.09 KG/M2

## 2024-05-29 DIAGNOSIS — M17.11 OSTEOARTHRITIS OF RIGHT KNEE, UNSPECIFIED OSTEOARTHRITIS TYPE: Primary | ICD-10-CM

## 2024-05-29 PROCEDURE — 20611 DRAIN/INJ JOINT/BURSA W/US: CPT | Performed by: PHYSICIAN ASSISTANT

## 2024-05-29 RX ORDER — TRIAMCINOLONE ACETONIDE 40 MG/ML
40 INJECTION, SUSPENSION INTRA-ARTICULAR; INTRAMUSCULAR ONCE
Status: COMPLETED | OUTPATIENT
Start: 2024-05-29 | End: 2024-05-29

## 2024-05-29 RX ADMIN — TRIAMCINOLONE ACETONIDE 40 MG: 40 INJECTION, SUSPENSION INTRA-ARTICULAR; INTRAMUSCULAR at 08:46

## 2024-05-29 NOTE — PROGRESS NOTES
Name: Sherri Salmeron  YOB: 1965  Gender: female  MRN: [id number]      Current Outpatient Medications:     OZEMPIC, 2 MG/DOSE, 8 MG/3ML SOPN, Inject 2 mg into the skin every 7 days, Disp: , Rfl:     albuterol sulfate HFA (PROVENTIL;VENTOLIN;PROAIR) 108 (90 Base) MCG/ACT inhaler, Inhale 2 puffs into the lungs every 4 hours as needed, Disp: , Rfl:     cyclobenzaprine (FLEXERIL) 10 MG tablet, Take 0.5-1 tablets by mouth 3 times daily as needed, Disp: , Rfl:     metFORMIN (GLUCOPHAGE-XR) 750 MG extended release tablet, Take 1 tablet by mouth daily (with breakfast), Disp: , Rfl:     ROSUVASTATIN CALCIUM PO, , Disp: , Rfl:     levothyroxine (SYNTHROID) 125 MCG tablet, Take 1 tablet by mouth every morning (before breakfast), Disp: 90 tablet, Rfl: 3    etodolac (LODINE) 400 MG tablet, Take by mouth 2 times daily (with meals), Disp: , Rfl:     FLUoxetine HCl, PMDD, 20 MG TABS, TAKE 3 TABLETS BY MOUTH EVERY DAY, Disp: , Rfl:     ibuprofen (ADVIL;MOTRIN) 200 MG tablet, Take 1 tablet by mouth as needed, Disp: , Rfl:     lisinopril (PRINIVIL;ZESTRIL) 10 MG tablet, Take 1 tablet by mouth daily, Disp: , Rfl:     omeprazole (PRILOSEC) 40 MG delayed release capsule, TAKE ONE CAPSULE BY MOUTH ONCE DAILY., Disp: , Rfl:     polyethylene glycol (GLYCOLAX) 17 GM/SCOOP powder, Take 17 g by mouth daily as needed, Disp: , Rfl:     SUMAtriptan (IMITREX) 100 MG tablet, Take 1 tablet by mouth once as needed, Disp: , Rfl:   Allergies   Allergen Reactions    Hydrocodone-Acetaminophen Nausea And Vomiting       CC: Right knee pain    Impression: Osteoarthritis the right knee    Procedure: Kenalog injection with US guidance    Radiology Report:  Onit US unit with a variable frequency (6.0-15.0 MHz) linear transducer was used to examine the intracondylar notch, retropatellar fat pad, patella tendon, patella, and tibia as well as to ensure adequate needle placement.  Injection image was obtained and placed in the patient's permanent

## 2024-08-30 ENCOUNTER — OFFICE VISIT (OUTPATIENT)
Dept: ORTHOPEDIC SURGERY | Age: 59
End: 2024-08-30

## 2024-08-30 DIAGNOSIS — M17.11 OSTEOARTHRITIS OF RIGHT KNEE, UNSPECIFIED OSTEOARTHRITIS TYPE: Primary | ICD-10-CM

## 2024-08-30 RX ORDER — TRIAMCINOLONE ACETONIDE 40 MG/ML
40 INJECTION, SUSPENSION INTRA-ARTICULAR; INTRAMUSCULAR ONCE
Status: COMPLETED | OUTPATIENT
Start: 2024-08-30 | End: 2024-08-30

## 2024-08-30 RX ADMIN — TRIAMCINOLONE ACETONIDE 40 MG: 40 INJECTION, SUSPENSION INTRA-ARTICULAR; INTRAMUSCULAR at 09:05

## 2024-08-30 NOTE — PROGRESS NOTES
Name: Sherri Salmeron  YOB: 1965  Gender: female  MRN: [id number]    CC:   Chief Complaint   Patient presents with    Knee Pain     R         DIAGNOSIS:   Encounter Diagnosis   Name Primary?    Osteoarthritis of right knee, unspecified osteoarthritis type Yes        HPI:   The pain has been present for years and is becoming worse.  It hurts at night when sleeping.  The pain is located over the knee.  It does hurt to walk and gets worse with increased distances.   The pain does not radiate down the leg.  Numbness and tingling are not noted.   Treatment so far has been left knee arthroplasty with success in 2011      Current Outpatient Medications:     OZEMPIC, 2 MG/DOSE, 8 MG/3ML SOPN, Inject 2 mg into the skin every 7 days, Disp: , Rfl:     albuterol sulfate HFA (PROVENTIL;VENTOLIN;PROAIR) 108 (90 Base) MCG/ACT inhaler, Inhale 2 puffs into the lungs every 4 hours as needed, Disp: , Rfl:     cyclobenzaprine (FLEXERIL) 10 MG tablet, Take 0.5-1 tablets by mouth 3 times daily as needed, Disp: , Rfl:     metFORMIN (GLUCOPHAGE-XR) 750 MG extended release tablet, Take 1 tablet by mouth daily (with breakfast), Disp: , Rfl:     ROSUVASTATIN CALCIUM PO, , Disp: , Rfl:     levothyroxine (SYNTHROID) 125 MCG tablet, Take 1 tablet by mouth every morning (before breakfast), Disp: 90 tablet, Rfl: 3    etodolac (LODINE) 400 MG tablet, Take by mouth 2 times daily (with meals), Disp: , Rfl:     FLUoxetine HCl, PMDD, 20 MG TABS, TAKE 3 TABLETS BY MOUTH EVERY DAY, Disp: , Rfl:     ibuprofen (ADVIL;MOTRIN) 200 MG tablet, Take 1 tablet by mouth as needed, Disp: , Rfl:     lisinopril (PRINIVIL;ZESTRIL) 10 MG tablet, Take 1 tablet by mouth daily, Disp: , Rfl:     omeprazole (PRILOSEC) 40 MG delayed release capsule, TAKE ONE CAPSULE BY MOUTH ONCE DAILY., Disp: , Rfl:     polyethylene glycol (GLYCOLAX) 17 GM/SCOOP powder, Take 17 g by mouth daily as needed, Disp: , Rfl:     SUMAtriptan (IMITREX) 100 MG tablet, Take 1  Smokeless tobacco: Never   Substance and Sexual Activity    Alcohol use: No    Drug use: No    Sexual activity: Not on file   Other Topics Concern    Not on file   Social History Narrative    Not on file     Social Determinants of Health     Financial Resource Strain: Low Risk  (1/2/2024)    Received from [facility]    Financial Resource Strain     Difficulty Paying Living Expenses: Not very hard     Difficulty Paying Medical Expenses: No   Food Insecurity: No Food Insecurity (11/29/2023)    Received from [facility]    Food Insecurity     Worried about Running Out of Food in the Last Year: Never true     Ran Out of Food in the Last Year: Never true   Transportation Needs: No Transportation Needs (11/29/2023)    Received from [facility]    Transportation Needs     Lack of Transportation: No   Physical Activity: Insufficiently Active (1/2/2024)    Received from [facility]    Physical Activity     Days of Exercise per Week: 1     Minutes of Exercise per Session: 0     Total Minutes of Exercise per Week: 0   Stress: No Stress Concern Present (1/2/2024)    Received from [facility]    Stress     Feeling of Stress : Only a little   Social Connections: Socially Integrated (1/2/2024)    Received from [facility]    Social Connections     Frequency of Communication with Friends and Family: More than three times a week     Frequency of Social Gatherings with Friends and Family: More than three times a week   Recent Concern: Social Connections - Moderately Integrated (11/29/2023)    Received from [facility]    Social Connections     Frequency of Communication with Friends and Family: More than three times a week     Frequency of Social Gatherings with Friends and Family: Once a week   Intimate Partner Violence: Not At Risk (1/3/2024)    Received from [facility]    Intimate Partner Violence     Fear of

## 2024-08-30 NOTE — PROGRESS NOTES
Name: Sherri Salmeron  YOB: 1965  Gender: female  MRN: [id number]      Current Outpatient Medications:     OZEMPIC, 2 MG/DOSE, 8 MG/3ML SOPN, Inject 2 mg into the skin every 7 days, Disp: , Rfl:     albuterol sulfate HFA (PROVENTIL;VENTOLIN;PROAIR) 108 (90 Base) MCG/ACT inhaler, Inhale 2 puffs into the lungs every 4 hours as needed, Disp: , Rfl:     cyclobenzaprine (FLEXERIL) 10 MG tablet, Take 0.5-1 tablets by mouth 3 times daily as needed, Disp: , Rfl:     metFORMIN (GLUCOPHAGE-XR) 750 MG extended release tablet, Take 1 tablet by mouth daily (with breakfast), Disp: , Rfl:     ROSUVASTATIN CALCIUM PO, , Disp: , Rfl:     levothyroxine (SYNTHROID) 125 MCG tablet, Take 1 tablet by mouth every morning (before breakfast), Disp: 90 tablet, Rfl: 3    etodolac (LODINE) 400 MG tablet, Take by mouth 2 times daily (with meals), Disp: , Rfl:     FLUoxetine HCl, PMDD, 20 MG TABS, TAKE 3 TABLETS BY MOUTH EVERY DAY, Disp: , Rfl:     ibuprofen (ADVIL;MOTRIN) 200 MG tablet, Take 1 tablet by mouth as needed, Disp: , Rfl:     lisinopril (PRINIVIL;ZESTRIL) 10 MG tablet, Take 1 tablet by mouth daily, Disp: , Rfl:     omeprazole (PRILOSEC) 40 MG delayed release capsule, TAKE ONE CAPSULE BY MOUTH ONCE DAILY., Disp: , Rfl:     polyethylene glycol (GLYCOLAX) 17 GM/SCOOP powder, Take 17 g by mouth daily as needed, Disp: , Rfl:     SUMAtriptan (IMITREX) 100 MG tablet, Take 1 tablet by mouth once as needed, Disp: , Rfl:   Allergies   Allergen Reactions    Hydrocodone-Acetaminophen Nausea And Vomiting       CC: Right knee pain    Impression: Osteoarthritis the right knee    Procedure: Kenalog injection with US guidance    Radiology Report:  Whotever US unit with a variable frequency (6.0-15.0 MHz) linear transducer was used to examine the intracondylar notch, retropatellar fat pad, patella tendon, patella, and tibia as well as to ensure adequate needle placement.  Injection image was obtained and placed in the patient's permanent

## 2024-11-21 DIAGNOSIS — M17.11 OSTEOARTHRITIS OF RIGHT KNEE, UNSPECIFIED OSTEOARTHRITIS TYPE: Primary | ICD-10-CM

## 2024-12-18 ENCOUNTER — PREP FOR PROCEDURE (OUTPATIENT)
Dept: ORTHOPEDIC SURGERY | Age: 59
End: 2024-12-18

## 2024-12-18 DIAGNOSIS — Z01.818 PREOP EXAMINATION: Primary | ICD-10-CM

## 2024-12-18 RX ORDER — SODIUM CHLORIDE 0.9 % (FLUSH) 0.9 %
5-40 SYRINGE (ML) INJECTION EVERY 12 HOURS SCHEDULED
Status: CANCELLED | OUTPATIENT
Start: 2024-12-18

## 2024-12-18 RX ORDER — SODIUM CHLORIDE 9 MG/ML
INJECTION, SOLUTION INTRAVENOUS PRN
Status: CANCELLED | OUTPATIENT
Start: 2024-12-18

## 2024-12-18 RX ORDER — SODIUM CHLORIDE 0.9 % (FLUSH) 0.9 %
5-40 SYRINGE (ML) INJECTION PRN
Status: CANCELLED | OUTPATIENT
Start: 2024-12-18

## 2024-12-19 ENCOUNTER — HOSPITAL ENCOUNTER (OUTPATIENT)
Dept: REHABILITATION | Age: 59
Discharge: HOME | End: 2024-12-22
Payer: COMMERCIAL

## 2024-12-19 ENCOUNTER — HOSPITAL ENCOUNTER (OUTPATIENT)
Dept: SURGERY | Age: 59
Discharge: HOME | End: 2024-12-22
Payer: COMMERCIAL

## 2024-12-19 VITALS
RESPIRATION RATE: 16 BRPM | HEART RATE: 74 BPM | HEIGHT: 61 IN | OXYGEN SATURATION: 94 % | SYSTOLIC BLOOD PRESSURE: 116 MMHG | BODY MASS INDEX: 47.77 KG/M2 | DIASTOLIC BLOOD PRESSURE: 69 MMHG | TEMPERATURE: 97.6 F | WEIGHT: 253 LBS

## 2024-12-19 DIAGNOSIS — Z91.199 NONCOMPLIANCE WITH CPAP TREATMENT: Primary | ICD-10-CM

## 2024-12-19 DIAGNOSIS — Z01.818 PREOP EXAMINATION: ICD-10-CM

## 2024-12-19 LAB
ALBUMIN SERPL-MCNC: 3.5 G/DL (ref 3.2–4.6)
ALBUMIN/GLOB SERPL: 1 (ref 1–1.9)
ALP SERPL-CCNC: 84 U/L (ref 35–104)
ALT SERPL-CCNC: 12 U/L (ref 8–45)
ANION GAP SERPL CALC-SCNC: 10 MMOL/L (ref 7–16)
APTT PPP: 25.8 SEC (ref 23.3–37.4)
AST SERPL-CCNC: 19 U/L (ref 15–37)
BASOPHILS # BLD: 0.1 K/UL (ref 0–0.2)
BASOPHILS NFR BLD: 1 % (ref 0–2)
BILIRUB SERPL-MCNC: 0.3 MG/DL (ref 0–1.2)
BUN SERPL-MCNC: 14 MG/DL (ref 8–23)
CALCIUM SERPL-MCNC: 9.9 MG/DL (ref 8.8–10.2)
CHLORIDE SERPL-SCNC: 105 MMOL/L (ref 98–107)
CO2 SERPL-SCNC: 26 MMOL/L (ref 20–29)
CREAT SERPL-MCNC: 0.68 MG/DL (ref 0.6–1.1)
DIFFERENTIAL METHOD BLD: NORMAL
EKG ATRIAL RATE: 71 BPM
EKG DIAGNOSIS: NORMAL
EKG P AXIS: 36 DEGREES
EKG P-R INTERVAL: 156 MS
EKG Q-T INTERVAL: 396 MS
EKG QRS DURATION: 78 MS
EKG QTC CALCULATION (BAZETT): 430 MS
EKG R AXIS: 52 DEGREES
EKG T AXIS: 33 DEGREES
EKG VENTRICULAR RATE: 71 BPM
EOSINOPHIL # BLD: 0.2 K/UL (ref 0–0.8)
EOSINOPHIL NFR BLD: 2 % (ref 0.5–7.8)
ERYTHROCYTE [DISTWIDTH] IN BLOOD BY AUTOMATED COUNT: 13.6 % (ref 11.9–14.6)
EST. AVERAGE GLUCOSE BLD GHB EST-MCNC: 113 MG/DL
GLOBULIN SER CALC-MCNC: 3.5 G/DL (ref 2.3–3.5)
GLUCOSE SERPL-MCNC: 101 MG/DL (ref 70–99)
HBA1C MFR BLD: 5.6 % (ref 0–5.6)
HCT VFR BLD AUTO: 39.7 % (ref 35.8–46.3)
HGB BLD-MCNC: 13.1 G/DL (ref 11.7–15.4)
IMM GRANULOCYTES # BLD AUTO: 0 K/UL (ref 0–0.5)
IMM GRANULOCYTES NFR BLD AUTO: 0 % (ref 0–5)
INR PPP: 1
LYMPHOCYTES # BLD: 2.3 K/UL (ref 0.5–4.6)
LYMPHOCYTES NFR BLD: 24 % (ref 13–44)
MCH RBC QN AUTO: 29.4 PG (ref 26.1–32.9)
MCHC RBC AUTO-ENTMCNC: 33 G/DL (ref 31.4–35)
MCV RBC AUTO: 89 FL (ref 82–102)
MONOCYTES # BLD: 0.4 K/UL (ref 0.1–1.3)
MONOCYTES NFR BLD: 5 % (ref 4–12)
MRSA DNA SPEC QL NAA+PROBE: NOT DETECTED
NEUTS SEG # BLD: 6.4 K/UL (ref 1.7–8.2)
NEUTS SEG NFR BLD: 68 % (ref 43–78)
NRBC # BLD: 0 K/UL (ref 0–0.2)
PLATELET # BLD AUTO: 271 K/UL (ref 150–450)
PMV BLD AUTO: 10.9 FL (ref 9.4–12.3)
POTASSIUM SERPL-SCNC: 4.6 MMOL/L (ref 3.5–5.1)
PROT SERPL-MCNC: 7.1 G/DL (ref 6.3–8.2)
PROTHROMBIN TIME: 13 SEC (ref 11.3–14.9)
RBC # BLD AUTO: 4.46 M/UL (ref 4.05–5.2)
S AUREUS CPE NOSE QL NAA+PROBE: NOT DETECTED
SODIUM SERPL-SCNC: 141 MMOL/L (ref 136–145)
WBC # BLD AUTO: 9.4 K/UL (ref 4.3–11.1)

## 2024-12-19 PROCEDURE — 85025 COMPLETE CBC W/AUTO DIFF WBC: CPT

## 2024-12-19 PROCEDURE — 97161 PT EVAL LOW COMPLEX 20 MIN: CPT

## 2024-12-19 PROCEDURE — 87641 MR-STAPH DNA AMP PROBE: CPT

## 2024-12-19 PROCEDURE — 85610 PROTHROMBIN TIME: CPT

## 2024-12-19 PROCEDURE — 93010 ELECTROCARDIOGRAM REPORT: CPT | Performed by: INTERNAL MEDICINE

## 2024-12-19 PROCEDURE — 83036 HEMOGLOBIN GLYCOSYLATED A1C: CPT

## 2024-12-19 PROCEDURE — 93005 ELECTROCARDIOGRAM TRACING: CPT | Performed by: ANESTHESIOLOGY

## 2024-12-19 PROCEDURE — 85730 THROMBOPLASTIN TIME PARTIAL: CPT

## 2024-12-19 PROCEDURE — 80053 COMPREHEN METABOLIC PANEL: CPT

## 2024-12-19 RX ORDER — BUSPIRONE HYDROCHLORIDE 10 MG/1
10 TABLET ORAL 2 TIMES DAILY PRN
COMMUNITY
Start: 2024-10-15 | End: 2025-07-12

## 2024-12-19 RX ORDER — FENOFIBRIC ACID 135 MG/1
135 CAPSULE, DELAYED RELEASE ORAL DAILY
COMMUNITY

## 2024-12-19 RX ORDER — TIRZEPATIDE 15 MG/.5ML
INJECTION, SOLUTION SUBCUTANEOUS
COMMUNITY

## 2024-12-19 RX ORDER — SIMVASTATIN 10 MG
10 TABLET ORAL NIGHTLY
COMMUNITY

## 2024-12-19 RX ORDER — CELECOXIB 200 MG/1
200 CAPSULE ORAL DAILY
COMMUNITY

## 2024-12-19 ASSESSMENT — PAIN DESCRIPTION - LOCATION: LOCATION: KNEE

## 2024-12-19 ASSESSMENT — KOOS JR
GOING UP OR DOWN STAIRS: SEVERE
KOOS JR TOTAL INTERVAL SCORE: 36.931
TWISING OR PIVOTING ON KNEE: SEVERE
RISING FROM SITTING: SEVERE
HOW SEVERE IS YOUR KNEE STIFFNESS AFTER FIRST WAKING IN MORNING: EXTREME
STANDING UPRIGHT: MODERATE
BENDING TO THE FLOOR TO PICK UP OBJECT: EXTREME
STRAIGHTENING KNEE FULLY: MILD

## 2024-12-19 ASSESSMENT — PAIN SCALES - GENERAL: PAINLEVEL_OUTOF10: 0

## 2024-12-19 NOTE — PERIOP NOTE
PLEASE CONTINUE TAKING ALL PRESCRIPTION MEDICATIONS UP TO THE DAY OF SURGERY UNLESS OTHERWISE DIRECTED BELOW.     DISCONTINUE all over the counter vitamins, supplements, and herbals 21 days prior to surgery. DISCONTINUE Non-Steroidal Anti-Inflammatory (NSAIDS) such as Advil, Ibuprofen, Motrin, Naproxen, and Aleve 21 days prior to surgery.      *IT IS OK TO TAKE TYLENOL, Allergy medication, and indigestion medications*     Prescription medications to HOLD     Hold Celebrex 21 days prior to surgery (per Dr. Ocasio)          CONTINUE all other prescriptions like normal up until the day of surgery--TAKE ONLY THE BELOW MEDICATIONS THE DAY OF SURGERY.    Levothyroxine           Metformin    Omeprazole              Buspar if needed   Fluoxetine      Comments   The day before surgery please take 2 Tylenol in the morning and then again before bed. You may use either regular or extra strength.        Bring incentive spirometer back to the hospital.          Please do not bring home medications with you on the day of surgery unless otherwise directed by your nurse.  If you are instructed to bring home medications, please give them to your nurse as they will be administered by the nursing staff.     If you have any questions, please call [facility] [phone].     A copy of this note was provided to the patient for reference.

## 2024-12-19 NOTE — PERIOP NOTE
Patient verified name and .    Order for consent was found in EHR and does match case posting; patient verified.     Type 3 surgery, joint assessment complete.    Labs per surgeon: CBC,CMP, A1C, PT/PTT; results pending.   Labs per anesthesia protocol: no additional  EKG: Completed today; results within anesthesia guidelines.     MRSA/MSSA swab collected per policy. MD to consult pharmacy to dose Vanc if appropriate.     Hospital approved surgical skin cleanser and instructions to return bottle on DOS given per hospital policy.    Patient provided with handouts including Guide to Surgery, Pain Management, Preventing Surgical Site Infections, and [facility] Brochure.    Patient answered medical/surgical history questions at their best of ability. All prior to admission medications documented in Epic. Original medication prescription bottle was not visualized during patient appointment.     Patient instructed to hold all vitamins 3 weeks prior to surgery and NSAIDS/Celebrex 21 days prior to surgery.     Patient instructed to be on a clear liquid diet the day before surgery due to being on Mounjaro. Patient provided the following information:    CLEAR LIQUID DIET    Things included in a clear liquid diet:     Water  Black Coffee (may have sugar but no milk or cream)  Tea  Any type soft drink  Apple, Cranberry, or Grape juice  Chicken bouillon or broth  Beef bouillon or broth  Popsicles  Jell-O (any flavor), NO solid fruit mixed in  Hard candy that is clear, such as lifesavers or lemon drops    Things NOT included in clear liquid:     Milk and milk products  Milkshakes  Any solid food  Any solid soup with solid ingredients such as noodles  Any creamed soup  Gum or Mints  Orange juice (or any other juice containing pulp)           **Please drink 32 ounces of non-caffeinated clear liquids, on the day of surgery, 4 hours prior to your arrival time to avoid dehydration.       Patient teach back successful and  patient demonstrates knowledge of instruction.

## 2024-12-19 NOTE — PERIOP NOTE
CLEAR LIQUID DIET THE DAY BEFORE SURGERY    Things included in a clear liquid diet:     Water  Black Coffee (may have sugar but no milk or cream)  Tea  Any type soft drink  Apple, Cranberry, or Grape juice  Chicken bouillon or broth  Beef bouillon or broth  Popsicles  Jell-O (any flavor), NO solid fruit mixed in  Hard candy that is clear, such as lifesavers or lemon drops    Things NOT included in clear liquid:     Milk and milk products  Milkshakes  Any solid food  Any solid soup with solid ingredients such as noodles  Any creamed soup  Gum or Mints  Orange juice (or any other juice containing pulp)

## 2024-12-19 NOTE — PROGRESS NOTES
Sherri Salmeron  : 1965  Primary: [employer]  Secondary:  [facility] [place] [address]  [phone]      Physical Therapy Prehab Evaluation Summary:2024   Time In/Out   PT Charge Capture  Episode     MEDICAL/REFERRING DIAGNOSIS: Unilateral primary osteoarthritis, right knee [M17.11]  REFERRING PHYSICIAN: Ino Ocasio,*    Treatment Diagnosis:   Pain in Right Knee (M25.561)  Stiffness of Right Knee, Not elsewhere classified (M25.661)  Difficulty in walking, Not elsewhere classified (R26.2)    DATE OF SURGERY: 25  Assessment:   COMMENTS:  Ms. Salmeron is present for a Prehab Physical Therapy Assessment for their upcoming right TKA. They are here alone. After discussing the surgical admission options and discharge plans, they are planning on discharging after one night in the hospital.    She will have support from her .  She had a left tka in .    PROBLEM LIST:   (Impacting functional limitations):  Ms. Salmeron presents with the following lower extremity(s) problems:  Strength  Range of Motion  Home Exercise Program  Pain INTERVENTIONS PLANNED:   (Benefits and precautions of physical therapy have been discussed with the patient.)  Home Exercise Program  Educational Discussion       GOALS: (Goals have been discussed and agreed upon with patient.)  Discharge Goals: Time Frame: 1 Day  Patient will demonstrate independence with a home exercise program designed to increase strength, range of motion, and pain control to minimize functional deficits and optimize patient for total joint replacement.    Subjective:   Past Medical History/Comorbidities:   Ms. Salmeron  has a past medical history of Acute sinusitis, Arthritis, Depression, WINSTON (generalized anxiety disorder), GERD (gastroesophageal reflux disease), Headache(784.0), Hypertension, Hypothyroidism, Migraine, Nausea & vomiting, SOLA (obstructive sleep apnea), Otitis media,  chart review, objective assessment, interpretation of assessment, and skilled monitoring of the patient's response to treatment in order to develop a plan of care.     TREATMENT PLAN:   Effective Dates: 12/19/2024 TO 12/19/2024.    Treatment/Session Assessment:  Patient was instructed in PT- HEP to increase strength and ROM in LEs.  Answered all questions.  Frequency/Duration: Patient to continue to perform home exercise program at least twice per day up until her surgery.    EDUCATION: Education Given To: Patient  Education Provided: Role of Therapy, Home Exercise Program  Education Method: Demonstration, Verbal  Education Outcome: Verbalized understanding    MEDICAL NECESSITY: Ms. Salmeron is expected to optimize herlower extremity strength and ROM in preparation for joint replacement surgery.    REASON FOR CONTINUED SERVICES: Achieve baseline assesment of musculoskeletal system, functional mobility and home environment., educate in PT HEP in preparation for surgery, educate in hospital plan of care.    COMPLIANCE WITH PROGRAM/EXERCISE: compliant most of the time, Will assess as treatment progresses.    TOTAL TREATMENT DURATION:  Time In: 0900  Time Out: 0910  Minutes: 10    Regarding Sherri Salmeron's therapy, I certify that the treatment plan above will be carried out by a therapist or under their direction.  Thank you for this referral,  MANUEL GREENE, PT

## 2024-12-19 NOTE — PERIOP NOTE
DAY OF SURGERY FLUID INSTRUCTIONS:      -Do not eat anything after midnight the night before surgery     -The anesthesia department would like for you to drink 32 ounces of non-caffeinated clear liquids 4 hours prior to arrival to avoid dehydration      -May have: Apple or Cranberry Juice, Gatorade, Water.      -You can have any combination of these clear liquids as long as it equals 32 ounces     -Do not place anything in your mouth for 4 full hours prior to your arrival to the hospital. This would include: Gum, Candy, Mints, Ice Chips, ect..              Above printed, reviewed, and provided to patient.

## 2024-12-19 NOTE — PROGRESS NOTES
Total Joint Surgery Preoperative Chart Review      Patient ID:  Sherri Salmeron  [id number]  59 y.o.  1965  Surgeon: Dr. Ocasio  Date of Surgery: 1/9/2025  Procedure: Total Right Knee Arthroplasty  Primary Care Physician: Magdalena Becerra [phone]  Specialty Physician(s):      Subjective:   Sherri Salmeron is a 59 y.o. White (non-) female who presents for preoperative evaluation for Total Right Knee arthroplasty.    This is a preoperative chart review note based on data collected by the nurse at the surgical Pre-Assessment visit.    Past Medical History:   Diagnosis Date    Acute sinusitis 4/17/2014    Arthritis     Depression     managed with medication    WINSTON (generalized anxiety disorder) 4/17/2014    managed with medication    GERD (gastroesophageal reflux disease)     managed with medication    Headache(784.0) 4/17/2014    Hypertension     managed with medication    Hypothyroidism 4/17/2014    managed with medication    Migraine     Nausea & vomiting     SOLA (obstructive sleep apnea)     Does not use CPAP    Otitis media 4/17/2014    Thyroid disease     hypothyroid- medication    Total knee replacement status 12/6/2011    Type 2 diabetes mellitus (HCC)     Metformin and Mounjaro, does not check BS daily, no problems with hypoglycemia      Past Surgical History:   Procedure Laterality Date    BREAST BIOPSY Left 1999    CHOLECYSTECTOMY  1997    COLONOSCOPY N/A 5/1/2020    COLONOSCOPY/ BMI 51 performed by Nader Diaz MD at [facility]    ORTHOPEDIC SURGERY  1992    carpal matt -bilateral    ORTHOPEDIC SURGERY  2002    left knee arthroscopy    TONSILLECTOMY  1993    TOTAL KNEE ARTHROPLASTY Left 2011    TUBAL LIGATION  1998     Family History   Problem Relation Age of Onset    Heart Disease Sister     Hypertension Sister     Diabetes Sister     Hypertension Father     Cancer Father     Heart Disease Father     Diabetes Father     Hypertension Mother     Heart Disease Mother       Physical Exam:   Patient Vitals for the past 24 hrs:   Temp Pulse Resp BP SpO2   12/19/24 0830 97.6 °F (36.4 °C) 74 16 116/69 94 %       ECG:    Encounter Date: 12/19/24   EKG 12 Lead   Result Value    Ventricular Rate 71    Atrial Rate 71    P-R Interval 156    QRS Duration 78    Q-T Interval 396    QTc Calculation (Bazett) 430    P Axis 36    R Axis 52    T Axis 33    Diagnosis      Normal sinus rhythm  Normal ECG  No previous ECGs available  Confirmed by MD RAVINDER ()SKY ([id number]) on 12/19/2024 10:10:49 AM         Data Review:   Labs:   Recent Results (from the past 24 hour(s))   EKG 12 Lead    Collection Time: 12/19/24  8:29 AM   Result Value Ref Range    Ventricular Rate 71 BPM    Atrial Rate 71 BPM    P-R Interval 156 ms    QRS Duration 78 ms    Q-T Interval 396 ms    QTc Calculation (Bazett) 430 ms    P Axis 36 degrees    R Axis 52 degrees    T Axis 33 degrees    Diagnosis       Normal sinus rhythm  Normal ECG  No previous ECGs available  Confirmed by MD RAVINDER ()SKY ([id number]) on 12/19/2024 10:10:49 AM     Protime-INR    Collection Time: 12/19/24  8:57 AM   Result Value Ref Range    Protime 13.0 11.3 - 14.9 sec    INR 1.0     Hemoglobin A1c    Collection Time: 12/19/24  8:57 AM   Result Value Ref Range    Hemoglobin A1C 5.6 0 - 5.6 %    Estimated Avg Glucose 113 mg/dL   Comprehensive Metabolic Panel    Collection Time: 12/19/24  8:57 AM   Result Value Ref Range    Sodium 141 136 - 145 mmol/L    Potassium 4.6 3.5 - 5.1 mmol/L    Chloride 105 98 - 107 mmol/L    CO2 26 20 - 29 mmol/L    Anion Gap 10 7 - 16 mmol/L    Glucose 101 (H) 70 - 99 mg/dL    BUN 14 8 - 23 MG/DL    Creatinine 0.68 0.60 - 1.10 MG/DL    Est, Glom Filt Rate >90 >60 ml/min/1.73m2    Calcium 9.9 8.8 - 10.2 MG/DL    Total Bilirubin 0.3 0.0 - 1.2 MG/DL    ALT 12 8 - 45 U/L    AST 19 15 - 37 U/L    Alk Phosphatase 84 35 - 104 U/L    Total Protein 7.1 6.3 - 8.2 g/dL    Albumin 3.5 3.2 - 4.6 g/dL    Globulin 3.5 2.3 - 3.5 g/dL

## 2024-12-19 NOTE — PERIOP NOTE
The below lab results are within anesthesia guidelines, no follow-up required. Labs automatically routed to ordering provider via Epic documentation.     Latest Reference Range & Units 12/19/24 08:57   Sodium 136 - 145 mmol/L 141   Potassium 3.5 - 5.1 mmol/L 4.6   Chloride 98 - 107 mmol/L 105   CARBON DIOXIDE 20 - 29 mmol/L 26   BUN,BUNPL 8 - 23 MG/DL 14   Creatinine 0.60 - 1.10 MG/DL 0.68   Anion Gap 7 - 16 mmol/L 10   Est, Glom Filt Rate >60 ml/min/1.73m2 >90   Glucose 70 - 99 mg/dL 101 (H)   Calcium 8.8 - 10.2 MG/DL 9.9   Albumin/Globulin Ratio 1.0 - 1.9   1.0   Total Protein 6.3 - 8.2 g/dL 7.1   Albumin 3.2 - 4.6 g/dL 3.5   Globulin 2.3 - 3.5 g/dL 3.5   Alkaline Phosphatase 35 - 104 U/L 84   ALT 8 - 45 U/L 12   AST 15 - 37 U/L 19   Total Bilirubin 0.0 - 1.2 MG/DL 0.3   Hemoglobin A1C 0 - 5.6 % 5.6   eAG (mg/dL) mg/dL 113   WBC 4.3 - 11.1 K/uL 9.4   RBC 4.05 - 5.2 M/uL 4.46   Hemoglobin Quant 11.7 - 15.4 g/dL 13.1   Hematocrit 35.8 - 46.3 % 39.7   MCV 82.0 - 102.0 FL 89.0   MCH 26.1 - 32.9 PG 29.4   MCHC 31.4 - 35.0 g/dL 33.0   MPV 9.4 - 12.3 FL 10.9   RDW 11.9 - 14.6 % 13.6   Platelet Count 150 - 450 K/uL 271   Neutrophils % 43 - 78 % 68   Lymphocyte % 13 - 44 % 24   Monocytes % 4.0 - 12.0 % 5   Eosinophils % 0.5 - 7.8 % 2   Basophils % 0.0 - 2.0 % 1   Neutrophils Absolute 1.7 - 8.2 K/UL 6.4   Lymphocytes Absolute 0.5 - 4.6 K/UL 2.3   Monocytes Absolute 0.1 - 1.3 K/UL 0.4   Eosinophils Absolute 0.0 - 0.8 K/UL 0.2   Basophils Absolute 0.0 - 0.2 K/UL 0.1   Differential Type -   AUTOMATED   Immature Granulocytes % 0.0 - 5.0 % 0   Nucleated Red Blood Cells 0.0 - 0.2 K/uL 0.00   Immature Granulocytes Absolute 0.0 - 0.5 K/UL 0.0   Prothrombin Time 11.3 - 14.9 sec 13.0   INR -   1.0   aPTT 23.3 - 37.4 SEC 25.8   MRSA/STAPH AUREUS DNA  Rpt (IP)   (H): Data is abnormally high  (IP): In Process  Rpt: View report in Results Review for more information

## 2024-12-31 DIAGNOSIS — M17.11 OSTEOARTHRITIS OF RIGHT KNEE, UNSPECIFIED OSTEOARTHRITIS TYPE: Primary | ICD-10-CM

## 2025-01-02 RX ORDER — TRAMADOL HYDROCHLORIDE 50 MG/1
50 TABLET ORAL EVERY 6 HOURS PRN
Qty: 20 TABLET | Refills: 0 | Status: SHIPPED | OUTPATIENT
Start: 2025-01-02 | End: 2025-01-07

## 2025-01-06 ENCOUNTER — OFFICE VISIT (OUTPATIENT)
Dept: ORTHOPEDIC SURGERY | Age: 60
End: 2025-01-06

## 2025-01-06 DIAGNOSIS — M17.11 OSTEOARTHRITIS OF RIGHT KNEE, UNSPECIFIED OSTEOARTHRITIS TYPE: Primary | ICD-10-CM

## 2025-01-06 PROCEDURE — PREOPEXAM PRE-OP EXAM: Performed by: ORTHOPAEDIC SURGERY

## 2025-01-06 RX ORDER — CELECOXIB 100 MG/1
100 CAPSULE ORAL 2 TIMES DAILY
Qty: 60 CAPSULE | Refills: 0 | Status: SHIPPED | OUTPATIENT
Start: 2025-01-06

## 2025-01-06 RX ORDER — ONDANSETRON 4 MG/1
4 TABLET, FILM COATED ORAL EVERY 8 HOURS PRN
Qty: 20 TABLET | Refills: 0 | Status: SHIPPED | OUTPATIENT
Start: 2025-01-06

## 2025-01-06 RX ORDER — METHOCARBAMOL 750 MG/1
750 TABLET, FILM COATED ORAL 4 TIMES DAILY PRN
Qty: 30 TABLET | Refills: 0 | Status: SHIPPED | OUTPATIENT
Start: 2025-01-06 | End: 2025-01-16

## 2025-01-06 RX ORDER — OXYCODONE HYDROCHLORIDE 5 MG/1
5-10 TABLET ORAL EVERY 4 HOURS PRN
Qty: 60 TABLET | Refills: 0 | Status: SHIPPED | OUTPATIENT
Start: 2025-01-06 | End: 2025-01-11

## 2025-01-06 RX ORDER — ASPIRIN 81 MG/1
81 TABLET ORAL 2 TIMES DAILY
Qty: 70 TABLET | Refills: 0 | Status: SHIPPED | OUTPATIENT
Start: 2025-01-06 | End: 2025-02-10

## 2025-01-06 NOTE — H&P
[facility]  History and Physical Exam    Patient ID:  Sherri Salmeron  [id number]    59 y.o.  1965    Today: January 6, 2025    Vitals Signs: Reviewed as noted in medical record.    Allergies:   Allergies   Allergen Reactions    Hydrocodone-Acetaminophen Nausea And Vomiting       CC: Right knee pain    HPI:  Pt complains of right knee pain and difficulty ambulating.     Relevant PMH:   Past Medical History:   Diagnosis Date    Acute sinusitis 4/17/2014    Arthritis     Depression     managed with medication    WINSTON (generalized anxiety disorder) 4/17/2014    managed with medication    GERD (gastroesophageal reflux disease)     managed with medication    Headache(784.0) 4/17/2014    Hypertension     managed with medication    Hypothyroidism 4/17/2014    managed with medication    Migraine     Nausea & vomiting     SOLA (obstructive sleep apnea)     Does not use CPAP    Otitis media 4/17/2014    Thyroid disease     hypothyroid- medication    Total knee replacement status 12/6/2011    Type 2 diabetes mellitus (HCC)     Metformin and Mounjaro, does not check BS daily, no problems with hypoglycemia       Objective:                    HEENT: NC/AT                   Lungs:  clear                   Heart:   rrr                   Abdomen: soft                   Extremities:  Pain with rom of the right knee joint    Radiographs: reveal right knee osteoarthritis with loss of joint space and bone spurs.    Assessment: Osteoarthritis of right knee [M17.11]    Plan:  Proceed with scheduled Procedure(s) (LRB):  KNEE TOTAL ARTHROPLASTY ROBOTIC- right-arjun (Right) .  The patient has failed conservative treatment including NSAIDS, and injections.  Total joint replacement surgery and associated risks and benefits have been discussed with the patient along with implant selection including but not limited to Sultana and DePuy total joint systems.  Due to the amount of pain the patient is experiencing we will  proceed with the scheduled procedure.  Will plan for same day discharge.    Signed By: KAMI Damon  January 6, 2025

## 2025-01-06 NOTE — PROGRESS NOTES
Name: Sherri Salmeron  YOB: 1965  Gender: female  MRN: [id number]      Current Outpatient Medications:     traMADol (ULTRAM) 50 MG tablet, Take 1 tablet by mouth every 6 hours as needed for Pain for up to 5 days. Intended supply: 5 days. Take lowest dose possible to manage pain Max Daily Amount: 200 mg, Disp: 20 tablet, Rfl: 0    simvastatin (ZOCOR) 10 MG tablet, Take 1 tablet by mouth nightly, Disp: , Rfl:     fenofibric acid (TRILIPIX) 135 MG CPDR capsule, Take 1 capsule by mouth daily, Disp: , Rfl:     celecoxib (CELEBREX) 200 MG capsule, Take 1 capsule by mouth daily, Disp: , Rfl:     Tirzepatide (MOUNJARO) 15 MG/0.5ML SOAJ, Inject into the skin Every Monday, Disp: , Rfl:     busPIRone (BUSPAR) 10 MG tablet, Take 1 tablet by mouth 2 times daily as needed, Disp: , Rfl:     metFORMIN (GLUCOPHAGE-XR) 750 MG extended release tablet, Take 1 tablet by mouth daily (with breakfast), Disp: , Rfl:     levothyroxine (SYNTHROID) 125 MCG tablet, Take 1 tablet by mouth every morning (before breakfast), Disp: 90 tablet, Rfl: 3    FLUoxetine HCl, PMDD, 20 MG TABS, TAKE 3 TABLETS BY MOUTH EVERY DAY, Disp: , Rfl:     lisinopril (PRINIVIL;ZESTRIL) 10 MG tablet, Take 2 tablets by mouth daily, Disp: , Rfl:     omeprazole (PRILOSEC) 40 MG delayed release capsule, TAKE ONE CAPSULE BY MOUTH ONCE DAILY., Disp: , Rfl:     polyethylene glycol (GLYCOLAX) 17 GM/SCOOP powder, Take 17 g by mouth daily as needed, Disp: , Rfl:     SUMAtriptan (IMITREX) 100 MG tablet, Take 1 tablet by mouth once as needed, Disp: , Rfl:   Allergies   Allergen Reactions    Hydrocodone-Acetaminophen Nausea And Vomiting       Pre-op  exam Right TKA  CC:  right knee pain    History:  Patient returns today for pre-op exam prior to Right TKA.  Postop scripts have been e-scribed to patient's pharmacy.  See formal H&P in Epic chart.

## 2025-01-08 ENCOUNTER — TELEPHONE (OUTPATIENT)
Dept: ORTHOPEDIC SURGERY | Age: 60
End: 2025-01-08

## 2025-01-08 ENCOUNTER — ANESTHESIA EVENT (OUTPATIENT)
Dept: SURGERY | Age: 60
End: 2025-01-08
Payer: COMMERCIAL

## 2025-01-08 NOTE — TELEPHONE ENCOUNTER
She has surgery tomorrow and [facility] is telling her they can't fill the oxycodone. Please call the pharmacy and then let her know.

## 2025-01-08 NOTE — TELEPHONE ENCOUNTER
Called pharmacy-they said that she could  the Rx she just has to pay OOP. LM for patient on the number left that she needs to call her pharmacy but that they would have it ready for her to .

## 2025-01-09 ENCOUNTER — ANESTHESIA (OUTPATIENT)
Dept: SURGERY | Age: 60
End: 2025-01-09
Payer: COMMERCIAL

## 2025-01-09 ENCOUNTER — HOSPITAL ENCOUNTER (OUTPATIENT)
Age: 60
Discharge: HOME | End: 2025-01-10
Attending: ORTHOPAEDIC SURGERY | Admitting: ORTHOPAEDIC SURGERY
Payer: COMMERCIAL

## 2025-01-09 PROBLEM — M17.11 OSTEOARTHRITIS OF RIGHT KNEE, UNSPECIFIED OSTEOARTHRITIS TYPE: Status: ACTIVE | Noted: 2025-01-09

## 2025-01-09 LAB
GLUCOSE BLD STRIP.AUTO-MCNC: 115 MG/DL (ref 65–100)
SERVICE CMNT-IMP: ABNORMAL

## 2025-01-09 PROCEDURE — 2720000010 HC SURG SUPPLY STERILE: Performed by: ORTHOPAEDIC SURGERY

## 2025-01-09 PROCEDURE — 94760 N-INVAS EAR/PLS OXIMETRY 1: CPT

## 2025-01-09 PROCEDURE — 20985 CPTR-ASST DIR MS PX: CPT | Performed by: ORTHOPAEDIC SURGERY

## 2025-01-09 PROCEDURE — 2500000003 HC RX 250 WO HCPCS: Performed by: PHYSICIAN ASSISTANT

## 2025-01-09 PROCEDURE — 6360000002 HC RX W HCPCS: Performed by: ANESTHESIOLOGY

## 2025-01-09 PROCEDURE — 82962 GLUCOSE BLOOD TEST: CPT

## 2025-01-09 PROCEDURE — 6370000000 HC RX 637 (ALT 250 FOR IP): Performed by: ANESTHESIOLOGY

## 2025-01-09 PROCEDURE — 6360000002 HC RX W HCPCS: Performed by: PHYSICIAN ASSISTANT

## 2025-01-09 PROCEDURE — 6360000002 HC RX W HCPCS: Performed by: ORTHOPAEDIC SURGERY

## 2025-01-09 PROCEDURE — C1713 ANCHOR/SCREW BN/BN,TIS/BN: HCPCS | Performed by: ORTHOPAEDIC SURGERY

## 2025-01-09 PROCEDURE — 6360000002 HC RX W HCPCS: Performed by: NURSE ANESTHETIST, CERTIFIED REGISTERED

## 2025-01-09 PROCEDURE — C1776 JOINT DEVICE (IMPLANTABLE): HCPCS | Performed by: ORTHOPAEDIC SURGERY

## 2025-01-09 PROCEDURE — 2709999900 HC NON-CHARGEABLE SUPPLY: Performed by: ORTHOPAEDIC SURGERY

## 2025-01-09 PROCEDURE — 3700000000 HC ANESTHESIA ATTENDED CARE: Performed by: ORTHOPAEDIC SURGERY

## 2025-01-09 PROCEDURE — 27447 TOTAL KNEE ARTHROPLASTY: CPT | Performed by: PHYSICIAN ASSISTANT

## 2025-01-09 PROCEDURE — 3600000005 HC SURGERY LEVEL 5 BASE: Performed by: ORTHOPAEDIC SURGERY

## 2025-01-09 PROCEDURE — 97165 OT EVAL LOW COMPLEX 30 MIN: CPT

## 2025-01-09 PROCEDURE — 3600000015 HC SURGERY LEVEL 5 ADDTL 15MIN: Performed by: ORTHOPAEDIC SURGERY

## 2025-01-09 PROCEDURE — 97535 SELF CARE MNGMENT TRAINING: CPT

## 2025-01-09 PROCEDURE — 3700000001 HC ADD 15 MINUTES (ANESTHESIA): Performed by: ORTHOPAEDIC SURGERY

## 2025-01-09 PROCEDURE — 27447 TOTAL KNEE ARTHROPLASTY: CPT | Performed by: ORTHOPAEDIC SURGERY

## 2025-01-09 PROCEDURE — 6370000000 HC RX 637 (ALT 250 FOR IP): Performed by: PHYSICIAN ASSISTANT

## 2025-01-09 PROCEDURE — 7100000001 HC PACU RECOVERY - ADDTL 15 MIN: Performed by: ORTHOPAEDIC SURGERY

## 2025-01-09 PROCEDURE — 97530 THERAPEUTIC ACTIVITIES: CPT

## 2025-01-09 PROCEDURE — 97161 PT EVAL LOW COMPLEX 20 MIN: CPT

## 2025-01-09 PROCEDURE — 64447 NJX AA&/STRD FEMORAL NRV IMG: CPT | Performed by: ANESTHESIOLOGY

## 2025-01-09 PROCEDURE — 94761 N-INVAS EAR/PLS OXIMETRY MLT: CPT

## 2025-01-09 PROCEDURE — 2700000000 HC OXYGEN THERAPY PER DAY

## 2025-01-09 PROCEDURE — 6370000000 HC RX 637 (ALT 250 FOR IP)

## 2025-01-09 PROCEDURE — 7100000000 HC PACU RECOVERY - FIRST 15 MIN: Performed by: ORTHOPAEDIC SURGERY

## 2025-01-09 PROCEDURE — 2580000003 HC RX 258: Performed by: ANESTHESIOLOGY

## 2025-01-09 PROCEDURE — 2500000003 HC RX 250 WO HCPCS: Performed by: NURSE ANESTHETIST, CERTIFIED REGISTERED

## 2025-01-09 PROCEDURE — 2500000003 HC RX 250 WO HCPCS: Performed by: ORTHOPAEDIC SURGERY

## 2025-01-09 PROCEDURE — 2580000003 HC RX 258: Performed by: PHYSICIAN ASSISTANT

## 2025-01-09 DEVICE — IMPLANT PATELLAR DIA29MM THK9MM X3 ASYMMETRIC TRIATHLON: Type: IMPLANTABLE DEVICE | Site: PATELLA | Status: FUNCTIONAL

## 2025-01-09 DEVICE — IMPLANTABLE DEVICE: Type: IMPLANTABLE DEVICE | Site: KNEE | Status: FUNCTIONAL

## 2025-01-09 DEVICE — INSERT TIB SZ 4 THK9MM KNEE X3 CNDYL STBL BEAR TECHNOLOGY: Type: IMPLANTABLE DEVICE | Site: KNEE | Status: FUNCTIONAL

## 2025-01-09 DEVICE — COMPONENT TOT KNEE CAPPED PRIMARY K2STRYKER] STRYKER CORP]: Type: IMPLANTABLE DEVICE | Status: FUNCTIONAL

## 2025-01-09 DEVICE — CEMENT BNE 20GM HALF DOSE PMMA VISC RADPQ FAST: Type: IMPLANTABLE DEVICE | Site: KNEE | Status: FUNCTIONAL

## 2025-01-09 DEVICE — BASEPLATE TIB SZ 4 AP46MM ML70MM KNEE TRITANIUM 4 CRUCFRM: Type: IMPLANTABLE DEVICE | Site: KNEE | Status: FUNCTIONAL

## 2025-01-09 RX ORDER — LEVOTHYROXINE SODIUM 125 UG/1
125 TABLET ORAL
Status: DISCONTINUED | OUTPATIENT
Start: 2025-01-10 | End: 2025-01-10 | Stop reason: HOSPADM

## 2025-01-09 RX ORDER — EPHEDRINE SULFATE 50 MG/ML
INJECTION INTRAVENOUS
Status: DISCONTINUED | OUTPATIENT
Start: 2025-01-09 | End: 2025-01-09 | Stop reason: SDUPTHER

## 2025-01-09 RX ORDER — LABETALOL HYDROCHLORIDE 5 MG/ML
10 INJECTION, SOLUTION INTRAVENOUS
Status: DISCONTINUED | OUTPATIENT
Start: 2025-01-09 | End: 2025-01-09 | Stop reason: HOSPADM

## 2025-01-09 RX ORDER — SODIUM CHLORIDE 0.9 % (FLUSH) 0.9 %
5-40 SYRINGE (ML) INJECTION EVERY 12 HOURS SCHEDULED
Status: DISCONTINUED | OUTPATIENT
Start: 2025-01-09 | End: 2025-01-09 | Stop reason: HOSPADM

## 2025-01-09 RX ORDER — ROPIVACAINE HYDROCHLORIDE 2 MG/ML
INJECTION, SOLUTION EPIDURAL; INFILTRATION; PERINEURAL
Status: COMPLETED | OUTPATIENT
Start: 2025-01-09 | End: 2025-01-09

## 2025-01-09 RX ORDER — ONDANSETRON 2 MG/ML
4 INJECTION INTRAMUSCULAR; INTRAVENOUS
Status: DISCONTINUED | OUTPATIENT
Start: 2025-01-09 | End: 2025-01-09 | Stop reason: HOSPADM

## 2025-01-09 RX ORDER — SCOPOLAMINE 1 MG/3D
PATCH, EXTENDED RELEASE TRANSDERMAL
Status: COMPLETED
Start: 2025-01-09 | End: 2025-01-09

## 2025-01-09 RX ORDER — SENNA AND DOCUSATE SODIUM 50; 8.6 MG/1; MG/1
1 TABLET, FILM COATED ORAL 2 TIMES DAILY
Status: DISCONTINUED | OUTPATIENT
Start: 2025-01-09 | End: 2025-01-10 | Stop reason: HOSPADM

## 2025-01-09 RX ORDER — SODIUM CHLORIDE 0.9 % (FLUSH) 0.9 %
5-40 SYRINGE (ML) INJECTION PRN
Status: DISCONTINUED | OUTPATIENT
Start: 2025-01-09 | End: 2025-01-10 | Stop reason: HOSPADM

## 2025-01-09 RX ORDER — METHOCARBAMOL 750 MG/1
750 TABLET, FILM COATED ORAL 4 TIMES DAILY PRN
Status: DISCONTINUED | OUTPATIENT
Start: 2025-01-09 | End: 2025-01-10 | Stop reason: HOSPADM

## 2025-01-09 RX ORDER — SCOPOLAMINE 1 MG/3D
1 PATCH, EXTENDED RELEASE TRANSDERMAL
Status: DISCONTINUED | OUTPATIENT
Start: 2025-01-09 | End: 2025-01-09 | Stop reason: HOSPADM

## 2025-01-09 RX ORDER — KETAMINE HCL IN NACL, ISO-OSM 20 MG/2 ML
SYRINGE (ML) INJECTION
Status: DISCONTINUED | OUTPATIENT
Start: 2025-01-09 | End: 2025-01-09 | Stop reason: SDUPTHER

## 2025-01-09 RX ORDER — TRANEXAMIC ACID 100 MG/ML
INJECTION, SOLUTION INTRAVENOUS PRN
Status: DISCONTINUED | OUTPATIENT
Start: 2025-01-09 | End: 2025-01-09 | Stop reason: HOSPADM

## 2025-01-09 RX ORDER — POLYETHYLENE GLYCOL 3350 17 G/17G
17 POWDER, FOR SOLUTION ORAL DAILY PRN
Status: DISCONTINUED | OUTPATIENT
Start: 2025-01-09 | End: 2025-01-10 | Stop reason: HOSPADM

## 2025-01-09 RX ORDER — TRANEXAMIC ACID 100 MG/ML
INJECTION, SOLUTION INTRAVENOUS
Status: DISCONTINUED | OUTPATIENT
Start: 2025-01-09 | End: 2025-01-09 | Stop reason: SDUPTHER

## 2025-01-09 RX ORDER — KETOROLAC TROMETHAMINE 30 MG/ML
INJECTION, SOLUTION INTRAMUSCULAR; INTRAVENOUS
Status: DISCONTINUED | OUTPATIENT
Start: 2025-01-09 | End: 2025-01-09 | Stop reason: SDUPTHER

## 2025-01-09 RX ORDER — SODIUM CHLORIDE 9 MG/ML
INJECTION, SOLUTION INTRAVENOUS PRN
Status: DISCONTINUED | OUTPATIENT
Start: 2025-01-09 | End: 2025-01-09

## 2025-01-09 RX ORDER — PROCHLORPERAZINE EDISYLATE 5 MG/ML
5 INJECTION INTRAMUSCULAR; INTRAVENOUS
Status: DISCONTINUED | OUTPATIENT
Start: 2025-01-09 | End: 2025-01-09 | Stop reason: HOSPADM

## 2025-01-09 RX ORDER — PROPOFOL 10 MG/ML
INJECTION, EMULSION INTRAVENOUS
Status: DISCONTINUED | OUTPATIENT
Start: 2025-01-09 | End: 2025-01-09 | Stop reason: SDUPTHER

## 2025-01-09 RX ORDER — PANTOPRAZOLE SODIUM 40 MG/1
40 TABLET, DELAYED RELEASE ORAL
Status: DISCONTINUED | OUTPATIENT
Start: 2025-01-10 | End: 2025-01-10 | Stop reason: HOSPADM

## 2025-01-09 RX ORDER — ROPIVACAINE HYDROCHLORIDE 2 MG/ML
INJECTION, SOLUTION EPIDURAL; INFILTRATION; PERINEURAL PRN
Status: DISCONTINUED | OUTPATIENT
Start: 2025-01-09 | End: 2025-01-09 | Stop reason: HOSPADM

## 2025-01-09 RX ORDER — SODIUM CHLORIDE 9 MG/ML
INJECTION, SOLUTION INTRAVENOUS PRN
Status: DISCONTINUED | OUTPATIENT
Start: 2025-01-09 | End: 2025-01-10 | Stop reason: HOSPADM

## 2025-01-09 RX ORDER — ROCURONIUM BROMIDE 10 MG/ML
INJECTION, SOLUTION INTRAVENOUS
Status: DISCONTINUED | OUTPATIENT
Start: 2025-01-09 | End: 2025-01-09 | Stop reason: SDUPTHER

## 2025-01-09 RX ORDER — ACETAMINOPHEN 500 MG
1000 TABLET ORAL ONCE
Status: COMPLETED | OUTPATIENT
Start: 2025-01-09 | End: 2025-01-09

## 2025-01-09 RX ORDER — NEOSTIGMINE METHYLSULFATE 1 MG/ML
INJECTION INTRAVENOUS
Status: DISCONTINUED | OUTPATIENT
Start: 2025-01-09 | End: 2025-01-09 | Stop reason: SDUPTHER

## 2025-01-09 RX ORDER — ONDANSETRON 2 MG/ML
4 INJECTION INTRAMUSCULAR; INTRAVENOUS EVERY 6 HOURS PRN
Status: DISCONTINUED | OUTPATIENT
Start: 2025-01-09 | End: 2025-01-10 | Stop reason: HOSPADM

## 2025-01-09 RX ORDER — ASPIRIN 81 MG/1
81 TABLET ORAL 2 TIMES DAILY
Status: DISCONTINUED | OUTPATIENT
Start: 2025-01-09 | End: 2025-01-10 | Stop reason: HOSPADM

## 2025-01-09 RX ORDER — SODIUM CHLORIDE 0.9 % (FLUSH) 0.9 %
5-40 SYRINGE (ML) INJECTION EVERY 12 HOURS SCHEDULED
Status: DISCONTINUED | OUTPATIENT
Start: 2025-01-09 | End: 2025-01-10 | Stop reason: HOSPADM

## 2025-01-09 RX ORDER — GLYCOPYRROLATE 0.2 MG/ML
INJECTION INTRAMUSCULAR; INTRAVENOUS
Status: DISCONTINUED | OUTPATIENT
Start: 2025-01-09 | End: 2025-01-09 | Stop reason: SDUPTHER

## 2025-01-09 RX ORDER — NALOXONE HYDROCHLORIDE 0.4 MG/ML
INJECTION, SOLUTION INTRAMUSCULAR; INTRAVENOUS; SUBCUTANEOUS PRN
Status: DISCONTINUED | OUTPATIENT
Start: 2025-01-09 | End: 2025-01-09 | Stop reason: HOSPADM

## 2025-01-09 RX ORDER — SODIUM CHLORIDE, SODIUM LACTATE, POTASSIUM CHLORIDE, CALCIUM CHLORIDE 600; 310; 30; 20 MG/100ML; MG/100ML; MG/100ML; MG/100ML
INJECTION, SOLUTION INTRAVENOUS CONTINUOUS
Status: DISCONTINUED | OUTPATIENT
Start: 2025-01-09 | End: 2025-01-09 | Stop reason: HOSPADM

## 2025-01-09 RX ORDER — DEXAMETHASONE SODIUM PHOSPHATE 10 MG/ML
INJECTION INTRAMUSCULAR; INTRAVENOUS
Status: DISCONTINUED | OUTPATIENT
Start: 2025-01-09 | End: 2025-01-09 | Stop reason: SDUPTHER

## 2025-01-09 RX ORDER — EPHEDRINE SULFATE 5 MG/ML
INJECTION INTRAVENOUS
Status: DISCONTINUED | OUTPATIENT
Start: 2025-01-09 | End: 2025-01-09 | Stop reason: SDUPTHER

## 2025-01-09 RX ORDER — LIDOCAINE HYDROCHLORIDE 20 MG/ML
INJECTION, SOLUTION EPIDURAL; INFILTRATION; INTRACAUDAL; PERINEURAL
Status: DISCONTINUED | OUTPATIENT
Start: 2025-01-09 | End: 2025-01-09 | Stop reason: SDUPTHER

## 2025-01-09 RX ORDER — VANCOMYCIN HYDROCHLORIDE 1 G/20ML
INJECTION, POWDER, LYOPHILIZED, FOR SOLUTION INTRAVENOUS PRN
Status: DISCONTINUED | OUTPATIENT
Start: 2025-01-09 | End: 2025-01-09 | Stop reason: HOSPADM

## 2025-01-09 RX ORDER — SODIUM CHLORIDE 0.9 % (FLUSH) 0.9 %
5-40 SYRINGE (ML) INJECTION PRN
Status: DISCONTINUED | OUTPATIENT
Start: 2025-01-09 | End: 2025-01-09 | Stop reason: HOSPADM

## 2025-01-09 RX ORDER — METFORMIN HYDROCHLORIDE 750 MG/1
750 TABLET, EXTENDED RELEASE ORAL
Status: DISCONTINUED | OUTPATIENT
Start: 2025-01-10 | End: 2025-01-10 | Stop reason: HOSPADM

## 2025-01-09 RX ORDER — HYDROMORPHONE HYDROCHLORIDE 1 MG/ML
0.5 INJECTION, SOLUTION INTRAMUSCULAR; INTRAVENOUS; SUBCUTANEOUS
Status: DISCONTINUED | OUTPATIENT
Start: 2025-01-09 | End: 2025-01-10 | Stop reason: HOSPADM

## 2025-01-09 RX ORDER — FENTANYL CITRATE 50 UG/ML
100 INJECTION, SOLUTION INTRAMUSCULAR; INTRAVENOUS
Status: COMPLETED | OUTPATIENT
Start: 2025-01-09 | End: 2025-01-09

## 2025-01-09 RX ORDER — ONDANSETRON 4 MG/1
4 TABLET, ORALLY DISINTEGRATING ORAL EVERY 8 HOURS PRN
Status: DISCONTINUED | OUTPATIENT
Start: 2025-01-09 | End: 2025-01-10 | Stop reason: HOSPADM

## 2025-01-09 RX ORDER — ONDANSETRON 2 MG/ML
INJECTION INTRAMUSCULAR; INTRAVENOUS
Status: DISCONTINUED | OUTPATIENT
Start: 2025-01-09 | End: 2025-01-09 | Stop reason: SDUPTHER

## 2025-01-09 RX ORDER — OXYCODONE HYDROCHLORIDE 5 MG/1
10 TABLET ORAL EVERY 4 HOURS PRN
Status: DISCONTINUED | OUTPATIENT
Start: 2025-01-09 | End: 2025-01-09

## 2025-01-09 RX ORDER — SODIUM CHLORIDE 9 MG/ML
INJECTION, SOLUTION INTRAVENOUS PRN
Status: DISCONTINUED | OUTPATIENT
Start: 2025-01-09 | End: 2025-01-09 | Stop reason: HOSPADM

## 2025-01-09 RX ORDER — SODIUM CHLORIDE 9 MG/ML
INJECTION, SOLUTION INTRAVENOUS CONTINUOUS
Status: DISCONTINUED | OUTPATIENT
Start: 2025-01-09 | End: 2025-01-10 | Stop reason: HOSPADM

## 2025-01-09 RX ORDER — HYDROMORPHONE HYDROCHLORIDE 2 MG/ML
INJECTION, SOLUTION INTRAMUSCULAR; INTRAVENOUS; SUBCUTANEOUS
Status: DISCONTINUED | OUTPATIENT
Start: 2025-01-09 | End: 2025-01-09 | Stop reason: SDUPTHER

## 2025-01-09 RX ORDER — MIDAZOLAM HYDROCHLORIDE 2 MG/2ML
2 INJECTION, SOLUTION INTRAMUSCULAR; INTRAVENOUS
Status: COMPLETED | OUTPATIENT
Start: 2025-01-09 | End: 2025-01-09

## 2025-01-09 RX ORDER — DEXAMETHASONE SODIUM PHOSPHATE 4 MG/ML
INJECTION, SOLUTION INTRA-ARTICULAR; INTRALESIONAL; INTRAMUSCULAR; INTRAVENOUS; SOFT TISSUE
Status: COMPLETED | OUTPATIENT
Start: 2025-01-09 | End: 2025-01-09

## 2025-01-09 RX ORDER — HYDROMORPHONE HYDROCHLORIDE 2 MG/1
1 TABLET ORAL EVERY 4 HOURS PRN
Status: DISCONTINUED | OUTPATIENT
Start: 2025-01-09 | End: 2025-01-10 | Stop reason: HOSPADM

## 2025-01-09 RX ORDER — SUCCINYLCHOLINE/SOD CL,ISO/PF 200MG/10ML
SYRINGE (ML) INTRAVENOUS
Status: DISCONTINUED | OUTPATIENT
Start: 2025-01-09 | End: 2025-01-09 | Stop reason: SDUPTHER

## 2025-01-09 RX ORDER — OXYCODONE HYDROCHLORIDE 5 MG/1
5 TABLET ORAL
Status: DISCONTINUED | OUTPATIENT
Start: 2025-01-09 | End: 2025-01-09 | Stop reason: HOSPADM

## 2025-01-09 RX ORDER — ALBUTEROL SULFATE 0.83 MG/ML
2.5 SOLUTION RESPIRATORY (INHALATION) EVERY 6 HOURS PRN
Status: DISCONTINUED | OUTPATIENT
Start: 2025-01-09 | End: 2025-01-10 | Stop reason: HOSPADM

## 2025-01-09 RX ORDER — KETOROLAC TROMETHAMINE 30 MG/ML
INJECTION, SOLUTION INTRAMUSCULAR; INTRAVENOUS PRN
Status: DISCONTINUED | OUTPATIENT
Start: 2025-01-09 | End: 2025-01-09 | Stop reason: HOSPADM

## 2025-01-09 RX ORDER — LISINOPRIL 20 MG/1
20 TABLET ORAL DAILY
Status: DISCONTINUED | OUTPATIENT
Start: 2025-01-10 | End: 2025-01-10 | Stop reason: HOSPADM

## 2025-01-09 RX ORDER — HYDRALAZINE HYDROCHLORIDE 20 MG/ML
10 INJECTION INTRAMUSCULAR; INTRAVENOUS
Status: DISCONTINUED | OUTPATIENT
Start: 2025-01-09 | End: 2025-01-09 | Stop reason: HOSPADM

## 2025-01-09 RX ORDER — FENTANYL CITRATE 50 UG/ML
INJECTION, SOLUTION INTRAMUSCULAR; INTRAVENOUS
Status: DISCONTINUED | OUTPATIENT
Start: 2025-01-09 | End: 2025-01-09 | Stop reason: SDUPTHER

## 2025-01-09 RX ORDER — CELECOXIB 100 MG/1
100 CAPSULE ORAL 2 TIMES DAILY
Status: DISCONTINUED | OUTPATIENT
Start: 2025-01-09 | End: 2025-01-10 | Stop reason: HOSPADM

## 2025-01-09 RX ORDER — ACETAMINOPHEN 325 MG/1
650 TABLET ORAL EVERY 6 HOURS
Status: DISCONTINUED | OUTPATIENT
Start: 2025-01-09 | End: 2025-01-10 | Stop reason: HOSPADM

## 2025-01-09 RX ORDER — LIDOCAINE HYDROCHLORIDE 10 MG/ML
1 INJECTION, SOLUTION INFILTRATION; PERINEURAL
Status: DISCONTINUED | OUTPATIENT
Start: 2025-01-09 | End: 2025-01-09 | Stop reason: HOSPADM

## 2025-01-09 RX ADMIN — CEFAZOLIN 2000 MG: 2 INJECTION, POWDER, FOR SOLUTION INTRAMUSCULAR; INTRAVENOUS at 09:14

## 2025-01-09 RX ADMIN — HYDROMORPHONE HYDROCHLORIDE 0.5 MG: 1 INJECTION, SOLUTION INTRAMUSCULAR; INTRAVENOUS; SUBCUTANEOUS at 11:32

## 2025-01-09 RX ADMIN — GLYCOPYRROLATE 0.4 MG: 0.2 INJECTION INTRAMUSCULAR; INTRAVENOUS at 10:26

## 2025-01-09 RX ADMIN — PROMETHAZINE HYDROCHLORIDE 12.5 MG: 25 INJECTION INTRAMUSCULAR; INTRAVENOUS at 18:59

## 2025-01-09 RX ADMIN — FENTANYL CITRATE 50 MCG: 50 INJECTION, SOLUTION INTRAMUSCULAR; INTRAVENOUS at 09:32

## 2025-01-09 RX ADMIN — EPHEDRINE SULFATE 25 MG: 50 INJECTION, SOLUTION INTRAVENOUS at 09:26

## 2025-01-09 RX ADMIN — ACETAMINOPHEN 1000 MG: 500 TABLET, FILM COATED ORAL at 07:41

## 2025-01-09 RX ADMIN — Medication 20 MG: at 09:24

## 2025-01-09 RX ADMIN — ASPIRIN 81 MG: 81 TABLET, COATED ORAL at 20:47

## 2025-01-09 RX ADMIN — Medication 200 MG: at 09:10

## 2025-01-09 RX ADMIN — ONDANSETRON 4 MG: 2 INJECTION, SOLUTION INTRAMUSCULAR; INTRAVENOUS at 09:39

## 2025-01-09 RX ADMIN — METHOCARBAMOL 750 MG: 750 TABLET ORAL at 20:46

## 2025-01-09 RX ADMIN — KETOROLAC TROMETHAMINE 30 MG: 30 INJECTION, SOLUTION INTRAMUSCULAR at 10:26

## 2025-01-09 RX ADMIN — HYDROMORPHONE HYDROCHLORIDE 1 MG: 2 TABLET ORAL at 20:46

## 2025-01-09 RX ADMIN — CELECOXIB 100 MG: 100 CAPSULE ORAL at 20:47

## 2025-01-09 RX ADMIN — HYDROMORPHONE HYDROCHLORIDE 0.5 MG: 1 INJECTION, SOLUTION INTRAMUSCULAR; INTRAVENOUS; SUBCUTANEOUS at 11:42

## 2025-01-09 RX ADMIN — TRANEXAMIC ACID 1000 MG: 1 INJECTION, SOLUTION INTRAVENOUS at 10:15

## 2025-01-09 RX ADMIN — DEXAMETHASONE SODIUM PHOSPHATE 8 MG: 10 INJECTION INTRAMUSCULAR; INTRAVENOUS at 09:39

## 2025-01-09 RX ADMIN — HYDROMORPHONE HYDROCHLORIDE 0.5 MG: 1 INJECTION, SOLUTION INTRAMUSCULAR; INTRAVENOUS; SUBCUTANEOUS at 12:03

## 2025-01-09 RX ADMIN — CEFAZOLIN 2000 MG: 2 INJECTION, POWDER, FOR SOLUTION INTRAMUSCULAR; INTRAVENOUS at 18:51

## 2025-01-09 RX ADMIN — ROCURONIUM BROMIDE 10 MG: 10 INJECTION, SOLUTION INTRAVENOUS at 09:10

## 2025-01-09 RX ADMIN — HYDROMORPHONE HYDROCHLORIDE 2 MG: 2 INJECTION INTRAMUSCULAR; INTRAVENOUS; SUBCUTANEOUS at 09:46

## 2025-01-09 RX ADMIN — EPHEDRINE SULFATE 10 MG: 5 INJECTION INTRAVENOUS at 09:16

## 2025-01-09 RX ADMIN — NEOSTIGMINE METHYLSULFATE 3 MG: 1 INJECTION, SOLUTION INTRAVENOUS at 10:26

## 2025-01-09 RX ADMIN — FENTANYL CITRATE 50 MCG: 50 INJECTION INTRAMUSCULAR; INTRAVENOUS at 08:44

## 2025-01-09 RX ADMIN — ONDANSETRON 4 MG: 4 TABLET, ORALLY DISINTEGRATING ORAL at 15:26

## 2025-01-09 RX ADMIN — FENTANYL CITRATE 50 MCG: 50 INJECTION, SOLUTION INTRAMUSCULAR; INTRAVENOUS at 09:10

## 2025-01-09 RX ADMIN — OXYCODONE 10 MG: 5 TABLET ORAL at 15:29

## 2025-01-09 RX ADMIN — LIDOCAINE HYDROCHLORIDE 100 MG: 20 INJECTION, SOLUTION EPIDURAL; INFILTRATION; INTRACAUDAL; PERINEURAL at 09:10

## 2025-01-09 RX ADMIN — TRANEXAMIC ACID 1000 MG: 1 INJECTION, SOLUTION INTRAVENOUS at 09:15

## 2025-01-09 RX ADMIN — Medication 20 MG: at 09:10

## 2025-01-09 RX ADMIN — ROCURONIUM BROMIDE 40 MG: 10 INJECTION, SOLUTION INTRAVENOUS at 09:24

## 2025-01-09 RX ADMIN — SODIUM CHLORIDE, PRESERVATIVE FREE 10 ML: 5 INJECTION INTRAVENOUS at 20:47

## 2025-01-09 RX ADMIN — SODIUM CHLORIDE, SODIUM LACTATE, POTASSIUM CHLORIDE, AND CALCIUM CHLORIDE: 600; 310; 30; 20 INJECTION, SOLUTION INTRAVENOUS at 09:42

## 2025-01-09 RX ADMIN — DEXAMETHASONE SODIUM PHOSPHATE 5 MG: 4 INJECTION INTRA-ARTICULAR; INTRALESIONAL; INTRAMUSCULAR; INTRAVENOUS; SOFT TISSUE at 08:43

## 2025-01-09 RX ADMIN — ROPIVACAINE HYDROCHLORIDE 20 ML: 2 INJECTION, SOLUTION EPIDURAL; INFILTRATION at 08:43

## 2025-01-09 RX ADMIN — PROPOFOL 200 MG: 10 INJECTION, EMULSION INTRAVENOUS at 09:10

## 2025-01-09 RX ADMIN — MIDAZOLAM 2 MG: 1 INJECTION INTRAMUSCULAR; INTRAVENOUS at 08:44

## 2025-01-09 RX ADMIN — ACETAMINOPHEN 650 MG: 325 TABLET, FILM COATED ORAL at 18:51

## 2025-01-09 RX ADMIN — SODIUM CHLORIDE, SODIUM LACTATE, POTASSIUM CHLORIDE, AND CALCIUM CHLORIDE: 600; 310; 30; 20 INJECTION, SOLUTION INTRAVENOUS at 08:59

## 2025-01-09 RX ADMIN — EPHEDRINE SULFATE 10 MG: 5 INJECTION INTRAVENOUS at 09:23

## 2025-01-09 RX ADMIN — SENNOSIDES AND DOCUSATE SODIUM 1 TABLET: 50; 8.6 TABLET ORAL at 20:47

## 2025-01-09 ASSESSMENT — PAIN DESCRIPTION - PAIN TYPE
TYPE: SURGICAL PAIN

## 2025-01-09 ASSESSMENT — PAIN SCALES - GENERAL
PAINLEVEL_OUTOF10: 9
PAINLEVEL_OUTOF10: 8
PAINLEVEL_OUTOF10: 5
PAINLEVEL_OUTOF10: 0
PAINLEVEL_OUTOF10: 6
PAINLEVEL_OUTOF10: 3
PAINLEVEL_OUTOF10: 6
PAINLEVEL_OUTOF10: 2
PAINLEVEL_OUTOF10: 8
PAINLEVEL_OUTOF10: 0
PAINLEVEL_OUTOF10: 9

## 2025-01-09 ASSESSMENT — PAIN DESCRIPTION - LOCATION
LOCATION: KNEE

## 2025-01-09 ASSESSMENT — PAIN DESCRIPTION - ORIENTATION
ORIENTATION: RIGHT

## 2025-01-09 ASSESSMENT — PAIN DESCRIPTION - DESCRIPTORS
DESCRIPTORS: SORE
DESCRIPTORS: ACHING
DESCRIPTORS: ACHING
DESCRIPTORS: PATIENT UNABLE TO DESCRIBE
DESCRIPTORS: PATIENT UNABLE TO DESCRIBE
DESCRIPTORS: ACHING
DESCRIPTORS: PATIENT UNABLE TO DESCRIBE

## 2025-01-09 ASSESSMENT — PAIN DESCRIPTION - ONSET
ONSET: ON-GOING
ONSET: ON-GOING
ONSET: PROGRESSIVE

## 2025-01-09 ASSESSMENT — PAIN - FUNCTIONAL ASSESSMENT
PAIN_FUNCTIONAL_ASSESSMENT: ACTIVITIES ARE NOT PREVENTED
PAIN_FUNCTIONAL_ASSESSMENT: PREVENTS OR INTERFERES SOME ACTIVE ACTIVITIES AND ADLS

## 2025-01-09 ASSESSMENT — PAIN DESCRIPTION - FREQUENCY
FREQUENCY: CONTINUOUS

## 2025-01-09 ASSESSMENT — PAIN SCALES - WONG BAKER: WONGBAKER_NUMERICALRESPONSE: HURTS A LITTLE BIT

## 2025-01-09 NOTE — PROGRESS NOTES
01/09/25 1806   Treatment   Treatment Type IS   Oxygen Therapy/Pulse Ox   O2 Therapy Oxygen   $Oxygen $Daily Charge   O2 Device Nasal cannula   O2 Flow Rate (L/min) 2 L/min   Pulse 84   Respirations 17   SpO2 95 %   Pulse Oximeter Device Mode Intermittent   $Pulse Oximeter $Spot check (single)   Incentive Spirometry Tx   Treatment Effort Assisted by RT

## 2025-01-09 NOTE — H&P
The patient has end stage arthritis of the right knee. The patient was see and examined and there are no changes to the patient's orthopedic condition. They have tried conservative treatment for this condition; including antiinflammatories and lifestyle modifications and have failed. The necessity for the joint replacement is still present, and the H&P from the office is still current. The patient is admitted today forProcedure(s) (LRB):  KNEE TOTAL ARTHROPLASTY ROBOTIC- right-arjun (Right) .

## 2025-01-09 NOTE — CARE COORDINATION
Patient is a 59 y.o. year old female admitted for Right TKA . Patient plans to return home on discharge. Order received to arrange home health. Patient without preference towards agency. Referral sent to [facility] by Alyssa. Patient requesting we arrange a walker. Pt without preference towards provider. Referral sent to [facility]. Equipment delivered to the hospital room prior to discharge. Will follow until discharge.      01/09/25 3470   Service Assessment   Patient Orientation Alert and Oriented   Cognition Alert   History Provided By Patient   Services At/After Discharge   Transition of Care Consult (CM Consult) Home Health   Internal Home Health Yes   Services At/After Discharge Home Health;PT   Mode of Transport at Discharge Self   Confirm Follow Up Transport Self   Condition of Participation: Discharge Planning   The Plan for Transition of Care is related to the following treatment goals: improve mobility   The Patient and/or Patient Representative was provided with a Choice of Provider? Patient   The Patient and/Or Patient Representative agree with the Discharge Plan? Yes   Freedom of Choice list was provided with basic dialogue that supports the patient's individualized plan of care/goals, treatment preferences, and shares the quality data associated with the providers?  Yes

## 2025-01-09 NOTE — PROGRESS NOTES
Pt received to  337. Pt alert and oriented. Oriented pt to room and bed controls. Family at bedside.

## 2025-01-09 NOTE — OP NOTE
[facility]  Skip Robotic Assisted Total Knee Arthroplasty: Posterior Cruciate Retaining       Patient:Sherri Salmeron   : 1965  Medical Record Number:[id number]      Pre-operative Diagnosis:  right Knee Arthritis Osteoarthritis of right knee [M17.11]  Post-operative Diagnosis: Same  Location: [facility]    Date of Procedure: 2025  Surgeon: Ino Ocasio MD  Assistant:  AKMI Dmaon    Anesthesia: GET and  nerve block  Modifier: 22   BMI:Body mass index is 47.32 kg/m².      CPT- 63323- Total knee arthroplasty           44787- Other procedures on musculoskeletal system               Procedure:  Right Cementless Cruciate Retained Total Knee Arthroplasty with a cemented patella button    Tourniquet Time: none    EBL:  200 cc         The complexity of the total joint surgery requires the use of a first assistant for positioning, retraction and assistance in closure.  KAMI Hicks was this assistant.    This BMI for this patient is Body mass index is 47.32 kg/m².. BMI's between 30 and 38.9 are considered obese, while a BMI over 39 indicates the patient is morbidly obese. Obese and Morbidly obese patients make exposure and retraction extremely difficult. The patient's large size also makes implantation and proper insertion of total joint implants more difficult.     Sherri Salmeron was brought to the operating room and positioned on the operating table.  She was anesthestized with anesthesia. IV antibiotics was administered. Prior to the incision being made a timeout was called identifying the patient, procedure ,operative side and surgeon The operative leg was prepped and draped in the usual sterile manner.  An anterior longitudinal incision was accomplished just medial to the tibial tubercle and extending approximal 6 centimeters proximal to the superior pole of the patella over the knee.  A medial parapatellar capsular incision was performed. The

## 2025-01-09 NOTE — PROGRESS NOTES
OCCUPATIONAL THERAPY Initial Assessment, Daily Note, and PM      (Link to Caseload Tracking: OT Visit Days: 1  OT Orders   Time  OT Charge Capture  Rehab Caseload Tracker  Episode     Sherri Salmeron is a 59 y.o. female   PRIMARY DIAGNOSIS: Osteoarthritis of right knee  Osteoarthritis of right knee [M17.11]  Osteoarthritis of right knee, unspecified osteoarthritis type [M17.11]  Procedure(s) (LRB):  KNEE TOTAL ARTHROPLASTY ROBOTIC- right-arjun (Right)  Day of Surgery  Reason for Referral: Pain in Right Knee (M25.561)  Stiffness of Right Knee, Not elsewhere classified (M25.661)  Other lack of cordination (R27.8)  Difficulty in walking, Not elsewhere classified (R26.2)  Other abnormalities of gait and mobility (R26.89)  Outpatient in a bed: [place] / [place] / Product Type: *No Product type* /     ASSESSMENT:     REHAB RECOMMENDATIONS:   Recommendation to date pending progress:  Setting:  No further skilled occupational therapy after discharge from hospital    Equipment:    Rolling Walker     ASSESSMENT:  Ms. Salmeron is s/p right TKA and presents with decreased independence with functional mobility and activities of daily living as compared to baseline level of function and safety. Patient would benefit from skilled Occupational Therapy to maximize independence and safety with self-care task and functional mobility.   Patient very drowsy she had general anesthesia. She was min assist supine to sit. She sat on eob and felt nauseous. She vomited after drinking some ginger ale. She was assisted getting dressed as she would like to go home today. She stood and took steps to recliner with min assist. She was intermittently nauseous and vomited twice. She was incontinent urine. She stood with min and OT assisted with clothing, hygiene and brief management. She sat in recliner and donned a clean gown. She was set up in recliner. Nursing was notified that patient status and needs. She plans to spend the

## 2025-01-09 NOTE — PROGRESS NOTES
TRANSFER - IN REPORT:    Verbal report received from Yumiko oates Sherri Salmeron  being received from PACU for routine post-op      Report consisted of patient's Situation, Background, Assessment and   Recommendations(SBAR).     Information from the following report(s) Nurse Handoff Report was reviewed with the receiving nurse.    Opportunity for questions and clarification was provided.      Assessment completed upon patient's arrival to unit and care assumed.

## 2025-01-09 NOTE — ANESTHESIA PROCEDURE NOTES
Airway  Date/Time: 1/9/2025 9:14 AM  Urgency: elective    Airway not difficult    General Information and Staff    Patient location during procedure: OR  Performed: resident/CRNA/CAA   Performed by: Jennifer Barlow APRN - CRNA  Authorized by: Guicho John DO      Indications and Patient Condition  Indications for airway management: anesthesia and airway protection  Spontaneous Ventilation: absent  Sedation level: deep  Preoxygenated: yes  Patient position: ramp  MILS not maintained throughout  Mask difficulty assessment: vent by bag mask    Final Airway Details  Final airway type: endotracheal airway      Successful airway: ETT  Cuffed: yes   Successful intubation technique: direct laryngoscopy  Facilitating devices/methods: intubating stylet  Endotracheal tube insertion site: oral  Blade: Shira  Blade size: #3  ETT size (mm): 7.5  Cormack-Lehane Classification: grade I - full view of glottis  Placement verified by: chest auscultation and capnometry   Measured from: lips  ETT to lips (cm): 21  Number of attempts at approach: 1    no

## 2025-01-09 NOTE — PROGRESS NOTES
4 Eyes Skin Assessment     NAME:  Sherri Salmeron  YOB: 1965  MEDICAL RECORD NUMBER:  [id number]    The patient is being assessed for  Post-Op Surgical    I agree that at least one RN has performed a thorough Head to Toe Skin Assessment on the patient. ALL assessment sites listed below have been assessed.      Areas assessed by both nurses:    Head, Face, Ears, Shoulders, Back, Chest, Arms, Elbows, Hands, Sacrum. Buttock, Coccyx, Ischium, Legs. Feet and Heels, and Under Medical Devices         Does the Patient have a Wound? No noted wound(s)       Jas Prevention initiated by RN: Yes  Wound Care Orders initiated by RN: No    Pressure Injury (Stage 3,4, Unstageable, DTI, NWPT, and Complex wounds) if present, place Wound referral order by RN under : No    New Ostomies, if present place, Ostomy referral order under : No     Nurse 1 eSignature: Electronically signed by ZEINA JACKSON RN on 1/9/25 at 1:22 PM EST    **SHARE this note so that the co-signing nurse can place an eSignature**    Nurse 2 eSignature: Electronically signed by ayla gregorio RN on 1/9/25 at 1:32 PM EST

## 2025-01-09 NOTE — ANESTHESIA PRE PROCEDURE
Department of Anesthesiology  Preprocedure Note       Name:  Sherri Salmeron   Age:  59 y.o.  :  1965                                          MRN:  [id number]         Date:  2025      Surgeon: Surgeon(s):  Ino Ocasio MD    Procedure: Procedure(s):  KNEE TOTAL ARTHROPLASTY ROBOTIC- right-arjun    Medications prior to admission:   Prior to Admission medications    Medication Sig Start Date End Date Taking? Authorizing Provider   simvastatin (ZOCOR) 10 MG tablet Take 1 tablet by mouth nightly   Yes Provider, Historical, MD   fenofibric acid (TRILIPIX) 135 MG CPDR capsule Take 1 capsule by mouth daily   Yes Provider, Historical, MD   celecoxib (CELEBREX) 200 MG capsule Take 1 capsule by mouth daily   Yes Provider, MD Radha   busPIRone (BUSPAR) 10 MG tablet Take 1 tablet by mouth 2 times daily as needed 10/15/24 7/12/25 Yes Provider, MD Radha   metFORMIN (GLUCOPHAGE-XR) 750 MG extended release tablet Take 1 tablet by mouth daily (with breakfast) 3/8/23  Yes Provider, MD Radha   levothyroxine (SYNTHROID) 125 MCG tablet Take 1 tablet by mouth every morning (before breakfast) 23  Yes Saurav Lebron MD   FLUoxetine HCl, PMDD, 20 MG TABS TAKE 3 TABLETS BY MOUTH EVERY DAY 20  Yes Automatic Reconciliation, Ar   lisinopril (PRINIVIL;ZESTRIL) 10 MG tablet Take 2 tablets by mouth daily   Yes Automatic Reconciliation, Ar   omeprazole (PRILOSEC) 40 MG delayed release capsule TAKE ONE CAPSULE BY MOUTH ONCE DAILY. 19  Yes Automatic Reconciliation, Ar   SUMAtriptan (IMITREX) 100 MG tablet Take 1 tablet by mouth once as needed   Yes Automatic Reconciliation, Ar   aspirin 81 MG EC tablet Take 1 tablet by mouth 2 times daily  Patient not taking: Reported on 2025 1/6/25 2/10/25  Adair Bo PA   ondansetron (ZOFRAN) 4 MG tablet Take 1 tablet by mouth every 8 hours as needed for Nausea or Vomiting  Patient not taking: Reported on 2025   Adair Bo PA

## 2025-01-09 NOTE — ANESTHESIA PROCEDURE NOTES
Peripheral Block    Patient location during procedure: pre-op  Reason for block: post-op pain management and at surgeon's request  Start time: 1/9/2025 8:43 AM  End time: 1/9/2025 8:46 AM  Staffing  Performed: anesthesiologist   Anesthesiologist: Guicho John DO  Performed by: Guicho John DO  Authorized by: Guicho John DO    Preanesthetic Checklist  Completed: patient identified, IV checked, site marked, risks and benefits discussed, surgical/procedural consents, equipment checked, pre-op evaluation, timeout performed, anesthesia consent given, oxygen available and monitors applied/VS acknowledged  Peripheral Block   Patient position: supine  Prep: ChloraPrep  Provider prep: mask and sterile gloves  Patient monitoring: cardiac monitor, continuous pulse ox, frequent blood pressure checks, responsive to questions and oxygen  Block type: Femoral  Adductor canal  Laterality: right  Injection technique: single-shot  Guidance: ultrasound guided  Infiltration strength: 1 %  Dose: 3 mL    Needle   Needle type: insulated echogenic nerve stimulator needle   Needle gauge: 20 G  Needle localization: ultrasound guidance  Needle length: 10 cm  Assessment   Injection assessment: negative aspiration for heme, no paresthesia on injection, local visualized surrounding nerve on ultrasound and no intravascular symptoms  Paresthesia pain: none  Slow fractionated injection: yes  Hemodynamics: stable  Outcomes: uncomplicated and patient tolerated procedure well    Additional Notes  R/B/I discussed at length with pt including damage to nerve or muscle.    Needle inserted under real time Ultrasound guidance and placed in close proximity to nerve being blocked.  Ultrasound was used in real time to visualize spread of local anesthetic around nerve being blocked.  Nerve and surrounding structures appeared grossly normal.  A permanent Ultrasound image was stored in the chart  Medications

## 2025-01-09 NOTE — PROGRESS NOTES
ACUTE PHYSICAL THERAPY GOALS:   (Developed with and agreed upon by patient and/or caregiver.)  GOALS (1-4 days):  (1.)Ms. Salmeron will move from supine to sit and sit to supine  in bed with STAND BY ASSIST.  (2.)Ms. Salmeron will transfer from bed to chair and chair to bed with STAND BY ASSIST using the least restrictive device.  (3.)Ms. Salmeron will ambulate with STAND BY ASSIST for 125 feet with the least restrictive device.  (4.)Ms. Salmeron will ambulate up/down 2 steps with left railing with MINIMAL ASSIST.  (5.)Ms. Salmeron will increase right knee ROM to 3-90°.  ________________________________________________________________________________________________            PHYSICAL THERAPY: TOTAL KNEE ARTHROPLASTY Initial Assessment and PM  (Link to Caseload Tracking: PT Visit Days : 1  Acknowledge Orders  Time In/Out  PT Charge Capture  Rehab Caseload Tracker  Episode   Sherri Salmeron is a 59 y.o. female   PRIMARY DIAGNOSIS: Osteoarthritis of right knee  Osteoarthritis of right knee [M17.11]  Osteoarthritis of right knee, unspecified osteoarthritis type [M17.11]  Procedure(s) (LRB):  KNEE TOTAL ARTHROPLASTY ROBOTIC- right-arjun (Right)  Day of Surgery  Reason for Referral: Pain in Right Knee (M25.561)  Stiffness of Right Knee, Not elsewhere classified (M25.661)  Difficulty in walking, Not elsewhere classified (R26.2)  Outpatient in a bed[place] / Product Type: *No Product type* /     REHAB RECOMMENDATIONS:   Recommendation to date pending progress:  Setting:  Home Health Therapy    Equipment:     Has RW     RANGE OF MOTION:   Right Knee Flexion: R Knee Flexion (0-145): 75  Right Knee Extension: R Knee Extension (0): 5     GAIT: I Mod I S SBA CGA Min Mod Max Total  NT x2 Comments:   Level of Assistance [] [] [] [] [] [x] [] [] [] [] []            Weightbearing Status  Right Lower Extremity Weight Bearing: Weight Bearing As Tolerated    Distance  3 feet    Gait Quality Decreased mary ,

## 2025-01-09 NOTE — ANESTHESIA POSTPROCEDURE EVALUATION
Department of Anesthesiology  Postprocedure Note    Patient: Sherri Salmeron  MRN: [id number]  YOB: 1965  Date of evaluation: 1/9/2025    Procedure Summary       Date: 01/09/25 [place] [facility] [place] [facility]    Anesthesia Start: 0859 Anesthesia Stop: 1104    Procedure: KNEE TOTAL ARTHROPLASTY ROBOTIC- right-arjun (Right: Knee) Diagnosis:       Osteoarthritis of right knee      (Osteoarthritis of right knee [M17.11])    Surgeons: Ino Ocasio MD Responsible Provider: Guicho John DO    Anesthesia Type: General ASA Status: 3            Anesthesia Type: General    Yesica Phase I: Yesica Score: 9    Yesica Phase II:      Anesthesia Post Evaluation    Patient location during evaluation: PACU  Level of consciousness: awake and alert  Airway patency: patent  Nausea & Vomiting: no nausea  Cardiovascular status: hemodynamically stable  Respiratory status: acceptable  Hydration status: euvolemic  Pain management: satisfactory to patient    No notable events documented.  
0 = swallows foods/liquids without difficulty

## 2025-01-10 VITALS
OXYGEN SATURATION: 94 % | SYSTOLIC BLOOD PRESSURE: 105 MMHG | TEMPERATURE: 97.7 F | HEIGHT: 61 IN | RESPIRATION RATE: 18 BRPM | DIASTOLIC BLOOD PRESSURE: 71 MMHG | HEART RATE: 89 BPM | WEIGHT: 250.3 LBS | BODY MASS INDEX: 47.25 KG/M2

## 2025-01-10 PROCEDURE — 6360000002 HC RX W HCPCS: Performed by: PHYSICIAN ASSISTANT

## 2025-01-10 PROCEDURE — 97535 SELF CARE MNGMENT TRAINING: CPT

## 2025-01-10 PROCEDURE — 2500000003 HC RX 250 WO HCPCS: Performed by: PHYSICIAN ASSISTANT

## 2025-01-10 PROCEDURE — 97530 THERAPEUTIC ACTIVITIES: CPT

## 2025-01-10 PROCEDURE — 6370000000 HC RX 637 (ALT 250 FOR IP): Performed by: PHYSICIAN ASSISTANT

## 2025-01-10 RX ADMIN — ACETAMINOPHEN 650 MG: 325 TABLET, FILM COATED ORAL at 01:40

## 2025-01-10 RX ADMIN — LEVOTHYROXINE SODIUM 125 MCG: 0.12 TABLET ORAL at 08:44

## 2025-01-10 RX ADMIN — CELECOXIB 100 MG: 100 CAPSULE ORAL at 08:44

## 2025-01-10 RX ADMIN — ACETAMINOPHEN 650 MG: 325 TABLET, FILM COATED ORAL at 05:24

## 2025-01-10 RX ADMIN — CEFAZOLIN 2000 MG: 2 INJECTION, POWDER, FOR SOLUTION INTRAMUSCULAR; INTRAVENOUS at 01:41

## 2025-01-10 RX ADMIN — ASPIRIN 81 MG: 81 TABLET, COATED ORAL at 08:44

## 2025-01-10 RX ADMIN — PANTOPRAZOLE SODIUM 40 MG: 40 TABLET, DELAYED RELEASE ORAL at 05:24

## 2025-01-10 RX ADMIN — HYDROMORPHONE HYDROCHLORIDE 1 MG: 2 TABLET ORAL at 05:24

## 2025-01-10 RX ADMIN — METHOCARBAMOL 750 MG: 750 TABLET ORAL at 05:29

## 2025-01-10 RX ADMIN — METFORMIN HYDROCHLORIDE 750 MG: 750 TABLET, EXTENDED RELEASE ORAL at 08:44

## 2025-01-10 RX ADMIN — FLUOXETINE HYDROCHLORIDE 60 MG: 20 CAPSULE ORAL at 08:45

## 2025-01-10 RX ADMIN — SENNOSIDES AND DOCUSATE SODIUM 1 TABLET: 50; 8.6 TABLET ORAL at 08:44

## 2025-01-10 ASSESSMENT — PAIN SCALES - GENERAL: PAINLEVEL_OUTOF10: 6

## 2025-01-10 NOTE — PROGRESS NOTES
ACUTE PHYSICAL THERAPY GOALS:   (Developed with and agreed upon by patient and/or caregiver.)  GOALS (1-4 days):  (1.)Ms. Salmeron will move from supine to sit and sit to supine  in bed with STAND BY ASSIST.  (2.)Ms. Salmeron will transfer from bed to chair and chair to bed with STAND BY ASSIST using the least restrictive device.met  (3.)Ms. Salmeron will ambulate with STAND BY ASSIST for 125 feet with the least restrictive device.  (4.)Ms. Salmeron will ambulate up/down 2 steps with left railing with MINIMAL ASSIST.no steps, has ramp  (5.)Ms. Salmeron will increase right knee ROM to 3-90°.  ________________________________________________________________________________________________            PHYSICAL THERAPY: TOTAL KNEE ARTHROPLASTY Daily Note and AM  (Link to Caseload Tracking: PT Visit Days : 2  Acknowledge Orders  Time In/Out  PT Charge Capture  Rehab Caseload Tracker  Episode   Sherri Salmeron is a 59 y.o. female   PRIMARY DIAGNOSIS: Osteoarthritis of right knee  Osteoarthritis of right knee [M17.11]  Osteoarthritis of right knee, unspecified osteoarthritis type [M17.11]  Procedure(s) (LRB):  KNEE TOTAL ARTHROPLASTY ROBOTIC- right-arjun (Right)  1 Day Post-Op  Reason for Referral: Pain in Right Knee (M25.561)  Stiffness of Right Knee, Not elsewhere classified (M25.661)  Difficulty in walking, Not elsewhere classified (R26.2)  Outpatient in a bed[place] / Product Type: *No Product type* /     REHAB RECOMMENDATIONS:   Recommendation to date pending progress:  Setting:  Home Health Therapy    Equipment:     Has RW     RANGE OF MOTION:   Right Knee Flexion: R Knee Flexion (0-145): 83  Right Knee Extension: R Knee Extension (0): 1     GAIT: I Mod I S SBA CGA Min Mod Max Total  NT x2 Comments:   Level of Assistance [] [] [] [x] [] [] [] [] [] [] []            Weightbearing Status  Right Lower Extremity Weight Bearing: Weight Bearing As Tolerated    Distance  70 feet    Gait Quality Decreased  x2 Comments:   Bed Mobility    Rolling [] [] [] [] [] [] [] [] [] [] []    Supine to Sit [] [] [] [] [] [] [] [] [] [] [] In chair   Scooting [] [] [] [] [] [] [] [] [] [] []    Sit to Supine [] [] [] [] [] [] [] [] [] [] []    Transfers    Sit to Stand [] [] [] [x] [] [] [] [] [] [] []    Bed to Chair [] [] [] [] [] [] [] [] [] [] []    Stand to Sit [] [] [] [x] [] [] [] [] [] [] []    Stand Pivot [] [] [] [] [] [] [] [] [] [] []    Toilet [] [] [] [] [] [] [] [] [] [] []     [] [] [] [] [] [] [] [] [] [] []    I=Independent, Mod I=Modified Independent, S=Supervision, SBA=Standby Assistance, CGA=Contact Guard Assistance,   Min=Minimal Assistance, Mod=Moderate Assistance, Max=Maximal Assistance, Total=Total Assistance, NT=Not Tested    BALANCE: Good Fair+ Fair Fair- Poor NT Comments   Sitting Static [x] [] [] [] [] []    Sitting Dynamic [] [x] [] [] [] []              Standing Static [x] [x] [] [] [] [] With RW   Standing Dynamic [] [x] [x] [] [] [] With RW     PLAN:   FREQUENCY AND DURATION: BID for duration of hospital stay or until stated goals are met, whichever comes first.    THERAPY PROGNOSIS: Good    PROBLEM LIST:   (Skilled intervention is medically necessary to address:)  Decreased ADL/Functional Activities, Decreased Activity Tolerance, Decreased AROM/PROM, Decreased Gait Ability, Decreased Strength, Decreased Transfer Abilities, and Increased Pain   INTERVENTIONS PLANNED:   (Benefits and precautions of physical therapy have been discussed with the patient.)  Therapeutic Activity, Therapeutic Exercise/HEP, Gait Training, Modalities, and Education       TREATMENT:      TREATMENT:   Therapeutic Activity (40 Minutes): Therapeutic activity included Transfer Training, Ambulation on level ground, Sitting balance , Standing balance, and exercises to improve functional Activity tolerance, Balance, Coordination, Mobility, Strength, and ROM.    TREATMENT GRID:  THERAPEUTIC  EXERCISES: DATE:  1/9 DATE:  1/10 DATE:

## 2025-01-10 NOTE — PROGRESS NOTES
Had therapy. Has medications for home already. Home health arranged for therapy. Has follow up appt scheduled with Dr Ocasio. Discharge instructions given. Going to car via wheelchair.

## 2025-01-10 NOTE — PLAN OF CARE
Problem: Pain  Goal: Verbalizes/displays adequate comfort level or baseline comfort level  Outcome: Progressing     Problem: Chronic Conditions and Co-morbidities  Goal: Patient's chronic conditions and co-morbidity symptoms are monitored and maintained or improved  Outcome: Progressing  Flowsheets (Taken 1/9/2025 1323 by Loc Man, RN)  Care Plan - Patient's Chronic Conditions and Co-Morbidity Symptoms are Monitored and Maintained or Improved: Monitor and assess patient's chronic conditions and comorbid symptoms for stability, deterioration, or improvement     Problem: Discharge Planning  Goal: Discharge to home or other facility with appropriate resources  Outcome: Progressing  Flowsheets (Taken 1/9/2025 1323 by Loc Man, RN)  Discharge to home or other facility with appropriate resources: Identify barriers to discharge with patient and caregiver     Problem: Safety - Adult  Goal: Free from fall injury  Outcome: Progressing  Flowsheets (Taken 1/9/2025 1333 by Loc Man, RN)  Free From Fall Injury: Instruct family/caregiver on patient safety

## 2025-01-10 NOTE — PROGRESS NOTES
Pt placed on continuous o2 sat monitor for the night.  Working well with IS.   Report that the Holter looks okay with rare PVCs.  The final copy of the report I believe is to be read at [facility]

## 2025-01-10 NOTE — DISCHARGE INSTRUCTIONS
[facility]      Patient Discharge Instructions    Sherri Salmeron/[id number] : 1965    Admitted 2025 Discharged: 1/10/2025       IF YOU HAVE ANY PROBLEMS ONCE YOU ARE AT HOME CALL THE FOLLOWING NUMBERS:   Main office number: [phone]      Medications    The medications you are to continue on are listed on the medication reconciliation sheet.   Narcotic pain medications as well as supplemental iron can cause constipation. If this occurs try stopping the narcotic pain medication and/or the iron.   It is important that you take the medication exactly as they are prescribed.  Medications which increase your risk of blood clots are listed to stop for 5 weeks after surgery as well as medications or supplements which increase your risk of bleeding complications.   Keep your medication in the bottles provided by the pharmacist and keep a list of the medication names, dosages, and times to be taken in your wallet.   Do not take other medications without consulting your doctor.       Important Information    Do NOT smoke as this will greatly increase your risk of infection!    Resume your prehospital diet. If you have excessive nausea or vomitting call your doctor's office     Leg swelling and warmth is normal for 6 months after surgery. If you experience swelling in your leg elevate you leg while laying down with your toes above your heart. If you have sudden onset severe swelling with leg pain call our office. Use Ivan Hose stockings until we see you in the office for your follow up appointment.    The stitches deep inside take approximately 6 months to dissolve. There will be sharp shooting, stinging and burning pain. This is normal and will resolve between 3-6 months after surgery.     Difficulty sleeping is normal following total Knee and Hip replacement. You may try melatonin, an over-the-counter sleep aid or benadryl to help with sleep. Most patients will resume sleeping through the  clothing.  You may shower 24 to 48 hours after surgery. Pat the incision dry. Don't swim or take a bath for the first 2 weeks, or until your doctor tells you it is okay.   Exercise    Your rehab program will give you a number of exercises to do to help you get back your knee's range of motion and strength. Always do them as your therapist tells you.   Ice    For pain and swelling, put ice or a cold pack on the area for 10 to 20 minutes at a time. Put a thin cloth between the ice and your skin. If your doctor recommended cold therapy using a portable machine, follow the instructions that came with the machine.   Other instructions    Wear compression stockings if your doctor told you to. These may help to prevent blood clots. Your doctor will tell you how long you need to keep wearing the compression stockings.     Carry a medical alert card that says you have an artificial joint. You have metal pieces in your knee. These may set off some airport metal detectors.   Follow-up care is a key part of your treatment and safety. Be sure to make and go to all appointments, and call your doctor if you are having problems. It's also a good idea to know your test results and keep a list of the medicines you take.  When should you call for help?   Call 911 anytime you think you may need emergency care. For example, call if:    You passed out (lost consciousness).     You have severe trouble breathing.     You have sudden chest pain and shortness of breath, or you cough up blood.   Call your doctor now or seek immediate medical care if:    You have signs of infection, such as:  Increased pain, swelling, warmth, or redness.  Red streaks leading from the incision.  Pus draining from the incision.  A fever.     You have signs of a blood clot, such as:  Pain in your calf, back of the knee, thigh, or groin.  Redness and swelling in your leg or groin.     Your incision comes open and begins to bleed, or the bleeding increases.     You

## 2025-01-10 NOTE — PROGRESS NOTES
OCCUPATIONAL THERAPY Daily Note, Discharge, and AM      (Link to Caseload Tracking: OT Visit Days: 3  OT Orders   Time  OT Charge Capture  Rehab Caseload Tracker  Episode     Sherri Salmeron is a 59 y.o. female   PRIMARY DIAGNOSIS: Osteoarthritis of right knee  Osteoarthritis of right knee [M17.11]  Osteoarthritis of right knee, unspecified osteoarthritis type [M17.11]  Procedure(s) (LRB):  KNEE TOTAL ARTHROPLASTY ROBOTIC- right-arjun (Right)  1 Day Post-Op  Reason for Referral: Pain in Right Knee (M25.561)  Stiffness of Right Knee, Not elsewhere classified (M25.661)  Other lack of cordination (R27.8)  Difficulty in walking, Not elsewhere classified (R26.2)  Other abnormalities of gait and mobility (R26.89)  Outpatient in a bed: [place] / Product Type: *No Product type* /     ASSESSMENT:     REHAB RECOMMENDATIONS:   Recommendation to date pending progress:  Setting:  No further skilled occupational therapy after discharge from hospital    Equipment:    Rolling Walker     ASSESSMENT:  Ms. Salmeron is s/p right TKA and presents with decreased independence with functional mobility and activities of daily living as compared to baseline level of function and safety. Patient would benefit from skilled Occupational Therapy to maximize independence and safety with self-care task and functional mobility.   Patient very drowsy she had general anesthesia. She was min assist supine to sit. She sat on eob and felt nauseous. She vomited after drinking some ginger ale. She was assisted getting dressed as she would like to go home today. She stood and took steps to recliner with min assist. She was intermittently nauseous and vomited twice. She was incontinent urine. She stood with min and OT assisted with clothing, hygiene and brief management. She sat in recliner and donned a clean gown. She was set up in recliner. Nursing was notified that patient status and needs. She plans to spend the night and     Upper Body Bathing [] [] [x] [] [] [] [] [] [] []    Lower Body Bathing [] [] [] [] [] [x] [] [] [] []    Toileting [] [] [] [x] [x] [] [] [] [] []     Upper Body Dressing [] [] [x] [] [] [] [] [] [] []    Lower Body Dressing [] [] [] [] [] [x] [] [] [] []    Feeding [x] [] [] [] [] [] [] [] [] []    Grooming [] [] [x] [] [] [] [] [] [] []    Personal Device Care [] [] [] [] [] [] [] [] [] []    Functional Mobility [] [] [] [x] [] [] [] [] [] [] rw   I=Independent, Mod I=Modified Independent, S=Supervision/Setup, SBA=Standby Assistance, CGA=Contact Guard Assistance, Min=Minimal Assistance, Mod=Moderate Assistance, Max=Maximal Assistance, Total=Total Assistance, NT=Not Tested    PLAN:     FREQUENCY/DURATION   OT Plan of Care: 2 times/week, 1 time/week for duration of hospital stay or until stated goals are met, whichever comes first.    ACUTE OCCUPATIONAL THERAPY GOALS:   (Developed with and agreed upon by patient and/or caregiver.)    GOALS:   DISCHARGE GOALS (in preparation for going home/rehab):  3 days  1.  Ms. Salmeron will perform lower body dressing activity with minimal assist required to demonstrate improved functional mobility and safety.   -GOAL MET 1/10/25     2.  Ms. Salmeron will perform bathing activity with minimal assist required to demonstrate improved functional mobility and safety. -GOAL MET 1/10/25   3.  Ms. Salmeron will perform toileting activity with  contact guard assist to demonstrate improved functional mobility and safety. -GOAL MET 1/10/25   4.  Ms. Salmeron will perform all functional transfers transfer with contact guard assist to demonstrate improved functional mobility and safety. -GOAL MET 1/10/25       PROBLEM LIST:   (Skilled intervention is medically necessary to address:)  Decreased ADL/Functional Activities  Decreased Activity Tolerance  Decreased AROM/PROM  Decreased Balance  Decreased Cognition  Decreased Coordination  Decreased Gait Ability  Decreased Safety Awareness  Decreased

## 2025-01-10 NOTE — PROGRESS NOTES
EVALUATION: INTACT IMPAIRED   (See Comments)   UE AROM [x] []   UE PROM [] []   Strength [x]       Posture / Balance []  Contact guard assist in standing   Sensation []     Coordination []  Cga in standing     Tone []       Edema []    Activity Tolerance []    Fair due to nausea and vomiting   Hand Dominance R [] L []      COGNITION/  PERCEPTION: INTACT IMPAIRED   (See Comments)   Orientation [x]     Vision [x]     Hearing [x]     Cognition  [x]     Perception [x]       MOBILITY: I Mod I S SBA CGA Min Mod Max Total  NT x2 Comments:   Bed Mobility    Rolling [] [] [] [] [] [] [] [] [] [] []    Supine to Sit [] [] [] [] [x] [] [] [] [] [] []    Scooting [] [] [] [] [x] [] [] [] [] [] []    Sit to Supine [] [] [] [] [] [] [] [] [] [] []    Transfers    Sit to Stand [] [] [] [] [x] [] [] [] [] [] []    Bed to Chair [] [] [] [] [x] [] [] [] [] [] [] rw   Stand to Sit [] [] [] [] [x] [] [] [] [] [] []    Tub/Shower [] [] [] [] [] [] [] [] [] [] []       Toilet [] [] [] [] [] [] [] [] [] [] []        [] [] [] [] [] [] [] [] [] [] []    I=Independent, Mod I=Modified Independent, S=Supervision/Setup, SBA=Standby Assistance, CGA=Contact Guard Assistance, Min=Minimal Assistance, Mod=Moderate Assistance, Max=Maximal Assistance, Total=Total Assistance, NT=Not Tested    ACTIVITIES OF DAILY LIVING: I Mod I S SBA CGA Min Mod Max Total NT Comments   BASIC ADLs:              Upper Body Bathing [] [] [] [] [] [] [] [] [] []    Lower Body Bathing [] [] [] [] [] [] [] [] [] []    Toileting [] [] [] [] [] [] [] [] [] []     Upper Body Dressing [] [] [] [] [] [] [] [] [] []    Lower Body Dressing [] [] [] [] [] [] [] [] [] []    Feeding [x] [] [] [] [] [] [] [] [] []    Grooming [] [] [] [] [] [] [] [] [] []    Personal Device Care [] [] [] [] [] [] [] [] [] []    Functional Mobility [] [] [] [] [x] [] [] [] [] [] rw   I=Independent, Mod I=Modified Independent, S=Supervision/Setup, SBA=Standby Assistance, CGA=Contact Guard Assistance,  planning,  , walker use          AFTER TREATMENT PRECAUTIONS: Bed/Chair Locked, Call light within reach, Chair, Needs within reach, RN notified, and Visitors at bedside    INTERDISCIPLINARY COLLABORATION:  RN/ PCT, PT/ PTA, and OT/ BROWN    Interdisciplinary Patient Education  (Reference education tab)    [] Safe And Effective Hygiene  [x] Fall Precautions  [x] Precautions  [] D/C Instruction Review [] Self Care Training and Home Safety  [x] Walker Management/Safety  [x] Adaptive Equipment as Needed  [] Therapeutic Resting Position of Joint     TOTAL TREATMENT DURATION AND TIME:  Time In: 0815  Time Out: 0830  Minutes: 15    Mar Lyn, OT

## 2025-01-10 NOTE — PROGRESS NOTES
January 10, 2025         Post Op day: 1 Day Post-Op   Admit Diagnosis: Osteoarthritis of right knee [M17.11]  Osteoarthritis of right knee, unspecified osteoarthritis type [M17.11]  LAB:    No results found for this or any previous visit (from the past 24 hour(s)).  Vital Signs:    Patient Vitals for the past 8 hrs:   BP Temp Temp src Pulse Resp SpO2   01/10/25 0340 (!) 87/48 97.7 °F (36.5 °C) Oral 88 18 94 %     Temp (24hrs), Av.5 °F (36.4 °C), Min:97.2 °F (36.2 °C), Max:97.8 °F (36.6 °C)    Pain Control:      Subjective: Doing well, normal recovery experienced.     Objective:  No Acute Distress, Alert and Oriented, Neurovascular exam is normal       Assessment:   Patient Active Problem List   Diagnosis    WINSTON (generalized anxiety disorder)    Acute sinusitis    Arthritis    Osteoarthritis of left knee    Multinodular goiter    BMI 50.0-59.9, adult    HTN (hypertension)    Morbid obesity    Depression    Primary hypothyroidism    Hashimoto's thyroiditis    Total knee replacement status    Otitis media    Osteoarthritis of right knee    Noncompliance with CPAP treatment    Osteoarthritis of right knee, unspecified osteoarthritis type       Status Post Procedure(s) (LRB):  KNEE TOTAL ARTHROPLASTY ROBOTIC- right-arjun (Right)        Plan: Continue Physical Therapy, discharge home anticipated.   Signed By: Ino Ocasio MD

## 2025-01-15 DIAGNOSIS — Z96.651 S/P TOTAL KNEE ARTHROPLASTY, RIGHT: Primary | ICD-10-CM

## 2025-01-16 ENCOUNTER — TELEPHONE (OUTPATIENT)
Dept: ORTHOPEDIC SURGERY | Age: 60
End: 2025-01-16

## 2025-01-16 DIAGNOSIS — Z96.651 S/P TOTAL KNEE ARTHROPLASTY, RIGHT: Primary | ICD-10-CM

## 2025-01-16 NOTE — TELEPHONE ENCOUNTER
She left a voicemail message requesting a refill on Oxycodone 5mg to [facility] on [address] in [place].

## 2025-01-17 RX ORDER — OXYCODONE HYDROCHLORIDE 5 MG/1
5 TABLET ORAL
Qty: 30 TABLET | Refills: 0 | Status: SHIPPED | OUTPATIENT
Start: 2025-01-17 | End: 2025-01-22

## 2025-01-27 DIAGNOSIS — Z96.651 S/P TOTAL KNEE ARTHROPLASTY, RIGHT: Primary | ICD-10-CM

## 2025-01-27 RX ORDER — OXYCODONE HYDROCHLORIDE 5 MG/1
5 TABLET ORAL
Qty: 30 TABLET | Refills: 0 | Status: SHIPPED | OUTPATIENT
Start: 2025-01-27 | End: 2025-02-01

## 2025-01-27 RX ORDER — METHOCARBAMOL 750 MG/1
750 TABLET, FILM COATED ORAL 4 TIMES DAILY PRN
Qty: 20 TABLET | Refills: 0 | Status: SHIPPED | OUTPATIENT
Start: 2025-01-27

## 2025-02-03 ENCOUNTER — HOSPITAL ENCOUNTER (OUTPATIENT)
Dept: PHYSICAL THERAPY | Age: 60
Setting detail: RECURRING SERIES
Discharge: HOME | End: 2025-02-06
Payer: COMMERCIAL

## 2025-02-03 DIAGNOSIS — R26.2 DIFFICULTY IN WALKING, NOT ELSEWHERE CLASSIFIED: ICD-10-CM

## 2025-02-03 DIAGNOSIS — M25.661 STIFFNESS OF RIGHT KNEE: ICD-10-CM

## 2025-02-03 DIAGNOSIS — Z96.651 S/P TOTAL KNEE ARTHROPLASTY, RIGHT: Primary | ICD-10-CM

## 2025-02-03 DIAGNOSIS — M62.81 MUSCLE WEAKNESS (GENERALIZED): ICD-10-CM

## 2025-02-03 PROCEDURE — 97110 THERAPEUTIC EXERCISES: CPT

## 2025-02-03 PROCEDURE — 97161 PT EVAL LOW COMPLEX 20 MIN: CPT

## 2025-02-03 NOTE — THERAPY EVALUATION
a 6 minute walk test over level and mild unlevel terrain at >=1200 ft to be comparable to age related norms. 7.  [] Date:   8. Sherri Salmeron will be able to reach downward toward floor to retrieve a 30 lb object without LOB in order to safely complete heavy household chores and requirements for lifting at  in kitchen. 8.  [] Date:   9. Sherri Salmeron will increase max gait speed to >=4.0 ft/sec to improve mobility in household and community and decrease fall risk.  9.  [] Date:            Outcome Measure:   Tool Used: Five Times Sit to Stand (FTSST or 5xSST)   Score:  Initial: 35 seconds, no hands Most Recent: X seconds (Date: -- )   Interpretation of Score: This is a measure of functional lower limb muscle strength and quantifying functional change of transitional movements.  A score of 12 seconds or less indicates a normal level of function for community dwelling older individuals, a score of 15 seconds or more is at risk for fall.     Tool Used: Gait Speed   Score:  Initial Self Selected Walking Speed:  1.82 ft/sec w/cane Most Recent: X ft/sec (Date: -- )    Initial Max Walking Speed: 2.34 ft/sec w/ cane Most Recent: X ft/sec (Date: -- )   Interpretation of Score: Walking speed is used for assessing and monitoring functional status and over all health on an individual. The individual walks for 5 meters/16.4 ft with the therapist timing. The individual has an acceleration phase of 3 meters, or 9.8ft, prior to timing is initiated. A community ambulator walks at 2.62 ft/sec to 4.32 ft/sec. The MDC for a community dwelling older adult is a change of 0.45 ft/sec at a self selected pace. A MDC is not yet established for a community dwelling older adult for max walking speed.     Tool Used: Knee injury and Osteoarthritis Outcome Score for Joint Replacement (KOOS, JR)  Score:  Initial: 23 (Interval: 28.251) 2/3/2025 Most Recent: TBD   Interpretation of Score:  The KOOS, JR contains 7 items from the

## 2025-02-03 NOTE — PROGRESS NOTES
Sherri Salmeron  : 1965  Primary: [employer] ([employer])  Secondary:  [facility] @ [place] [address] [place] [address]  Phone: [phone]  Fax: [phone] Plan Frequency: 2x/wk for 8 weeks  Plan of Care/Certification Expiration Date: 25        Plan of Care/Certification Expiration Date:  Plan of Care/Certification Expiration Date: 25    Frequency/Duration: Plan Frequency: 2x/wk for 8 weeks      Time In/Out:   Time In: 1700  Time Out: 1745      PT Visit Info:         Visit Count:  1    OUTPATIENT PHYSICAL THERAPY:   Treatment Note 2/3/2025       Episode  (R TKA)               Treatment Diagnosis:    S/P total knee arthroplasty, right  Muscle weakness (generalized)  Difficulty in walking, not elsewhere classified  Stiffness of right knee  Medical/Referring Diagnosis:    S/P total knee arthroplasty, right [Z96.651]    Referring Physician:  Adair Bo PA MD Orders:  PT Eval and Treat   Return MD Appt:  25   Date of Onset:  Onset Date: 25     Allergies:   Hydrocodone-acetaminophen  Restrictions/Precautions:   None      Interventions Planned (Treatment may consist of any combination of the following):     See Assessment Note    Subjective Comments:   Please see eval  Initial Pain Level:   stiff/achy   /10  Post Session Pain Level:    stiff achy   /10  Medications Last Reviewed: 2/3/2025  Updated Objective Findings:  See Evaluation Note from today  Treatment     THERAPEUTIC EXERCISE: ( 15 minutes):    Exercises per grid below to improve mobility, strength, balance, and coordination. Required minimal visual, verbal, manual, and tactile cues to promote proper body mechanics.  Progressed resistance and repetitions as indicated.     Date:  2/3/2025 Date:   Date:     Activity/Exercise Parameters Parameters Parameters   Education Diagnosis, prognosis, POC, HEP, anatomy/physiology of condition, walking 1.0     STS 5x     L stretch 5x to squat

## 2025-02-04 RX ORDER — CELECOXIB 100 MG/1
100 CAPSULE ORAL 2 TIMES DAILY
Qty: 60 CAPSULE | Refills: 0 | OUTPATIENT
Start: 2025-02-04

## 2025-02-05 ENCOUNTER — HOSPITAL ENCOUNTER (OUTPATIENT)
Dept: PHYSICAL THERAPY | Age: 60
Setting detail: RECURRING SERIES
Discharge: HOME | End: 2025-02-08
Payer: COMMERCIAL

## 2025-02-05 PROCEDURE — 97110 THERAPEUTIC EXERCISES: CPT

## 2025-02-05 PROCEDURE — 97530 THERAPEUTIC ACTIVITIES: CPT

## 2025-02-05 NOTE — PROGRESS NOTES
5:15 PM Vanna Murillo R, PT [facility]   2/24/2025  5:15 PM Vanna Murillo R, PT [facility]   2/26/2025  5:15 PM Vanna Murillo R, PT [facility]   3/3/2025  5:15 PM Vanna Murillo R, PT [facility]   3/5/2025  5:15 PM Vanna Murillo R, PT [facility]   3/10/2025  5:15 PM Vanna Murillo R, PT [facility]   3/12/2025  5:15 PM Vanna Murillo R, PT [facility]   3/17/2025  5:15 PM Vanna Murillo R, PT [facility]   3/19/2025  5:15 PM Vanna Murillo, PT [facility]

## 2025-02-07 ENCOUNTER — OFFICE VISIT (OUTPATIENT)
Dept: ORTHOPEDIC SURGERY | Age: 60
End: 2025-02-07

## 2025-02-07 DIAGNOSIS — Z96.651 S/P TOTAL KNEE ARTHROPLASTY, RIGHT: Primary | ICD-10-CM

## 2025-02-07 NOTE — PROGRESS NOTES
Name: Sherri Salmeron  YOB: 1965  Gender: female  MRN: [id number]      Current Outpatient Medications:     methocarbamol (ROBAXIN-750) 750 MG tablet, Take 1 tablet by mouth 4 times daily as needed (PRN), Disp: 20 tablet, Rfl: 0    aspirin 81 MG EC tablet, Take 1 tablet by mouth 2 times daily, Disp: 70 tablet, Rfl: 0    ondansetron (ZOFRAN) 4 MG tablet, Take 1 tablet by mouth every 8 hours as needed for Nausea or Vomiting, Disp: 20 tablet, Rfl: 0    celecoxib (CELEBREX) 100 MG capsule, Take 1 capsule by mouth 2 times daily, Disp: 60 capsule, Rfl: 0    simvastatin (ZOCOR) 10 MG tablet, Take 1 tablet by mouth nightly, Disp: , Rfl:     fenofibric acid (TRILIPIX) 135 MG CPDR capsule, Take 1 capsule by mouth daily, Disp: , Rfl:     Tirzepatide (MOUNJARO) 15 MG/0.5ML SOAJ, Inject into the skin Every Monday, Disp: , Rfl:     busPIRone (BUSPAR) 10 MG tablet, Take 1 tablet by mouth 2 times daily as needed, Disp: , Rfl:     metFORMIN (GLUCOPHAGE-XR) 750 MG extended release tablet, Take 1 tablet by mouth daily (with breakfast), Disp: , Rfl:     levothyroxine (SYNTHROID) 125 MCG tablet, Take 1 tablet by mouth every morning (before breakfast), Disp: 90 tablet, Rfl: 3    FLUoxetine HCl, PMDD, 20 MG TABS, TAKE 3 TABLETS BY MOUTH EVERY DAY, Disp: , Rfl:     lisinopril (PRINIVIL;ZESTRIL) 10 MG tablet, Take 2 tablets by mouth daily, Disp: , Rfl:     omeprazole (PRILOSEC) 40 MG delayed release capsule, TAKE ONE CAPSULE BY MOUTH ONCE DAILY., Disp: , Rfl:     polyethylene glycol (GLYCOLAX) 17 GM/SCOOP powder, Take 17 g by mouth daily as needed, Disp: , Rfl:     SUMAtriptan (IMITREX) 100 MG tablet, Take 1 tablet by mouth once as needed, Disp: , Rfl:   Allergies   Allergen Reactions    Hydrocodone-Acetaminophen Nausea And Vomiting       Post-op right TKA    The patient returns now 4 weeks post right total knee arthroplasty.  Their pain is diminishing and the wound healing.   Their range of motion is

## 2025-02-10 RX ORDER — ASPIRIN 81 MG/1
81 TABLET, COATED ORAL 2 TIMES DAILY
Qty: 70 TABLET | Refills: 0 | OUTPATIENT
Start: 2025-02-10

## 2025-02-12 ENCOUNTER — HOSPITAL ENCOUNTER (OUTPATIENT)
Dept: PHYSICAL THERAPY | Age: 60
Setting detail: RECURRING SERIES
Discharge: HOME | End: 2025-02-15
Payer: COMMERCIAL

## 2025-02-12 PROCEDURE — 97110 THERAPEUTIC EXERCISES: CPT

## 2025-02-12 ASSESSMENT — PAIN DESCRIPTION - ORIENTATION: ORIENTATION: RIGHT

## 2025-02-12 ASSESSMENT — PAIN SCALES - GENERAL: PAINLEVEL_OUTOF10: 3

## 2025-02-12 ASSESSMENT — PAIN DESCRIPTION - LOCATION: LOCATION: KNEE

## 2025-02-12 NOTE — PROGRESS NOTES
Treatment Billable Duration:  40 minutes therapeutic exercise   Time In: 1600  Time Out: 1645     Velasquez Paulino PT         Charge Capture  Events  [employer] Portal  Appt Desk  Attendance Report     Future Appointments   Date Time Provider Department Center   2/17/2025  5:15 PM Vanna Murillo R, PT [facility]   2/19/2025  5:15 PM Vanna Murillo R, PT [facility]   2/24/2025  5:15 PM Vanna Murillo R, PT [facility]   2/26/2025  5:15 PM Vanna Murillo R, PT [facility]   3/3/2025  5:15 PM Vanna Murillo R, PT [facility]   3/5/2025  5:15 PM Vanna Murillo R, PT [facility]   3/10/2025  5:15 PM Vanna Murillo R, PT [facility]   3/12/2025  5:15 PM Vanna Murillo R, PT [facility]   3/17/2025  5:15 PM Vanna Murillo R, PT [facility]   3/19/2025  5:15 PM Vanna Murillo R, PT [facility]   7/7/2025  8:30 AM Ino Ocasio MD [facility]

## 2025-02-17 ENCOUNTER — HOSPITAL ENCOUNTER (OUTPATIENT)
Dept: PHYSICAL THERAPY | Age: 60
Setting detail: RECURRING SERIES
Discharge: HOME | End: 2025-02-20
Payer: COMMERCIAL

## 2025-02-17 PROCEDURE — 97140 MANUAL THERAPY 1/> REGIONS: CPT

## 2025-02-17 PROCEDURE — 97110 THERAPEUTIC EXERCISES: CPT

## 2025-02-17 NOTE — PROGRESS NOTES
Sherri Salmeron  : 1965  Primary: [employer] (Yun [employer])  Secondary:  [facility] @ [place] [address] [place] [address]  Phone: [phone]  Fax: [phone] Plan Frequency: 2x/wk for 8 weeks  Plan of Care/Certification Expiration Date: 25        Plan of Care/Certification Expiration Date:  Plan of Care/Certification Expiration Date: 25    Frequency/Duration: Plan Frequency: 2x/wk for 8 weeks      Time In/Out:   Time In: 1705  Time Out: 1758      PT Visit Info:         Visit Count:  4    OUTPATIENT PHYSICAL THERAPY:   Treatment Note 2025       Episode  (R TKA)               Treatment Diagnosis:    S/P total knee arthroplasty, right  Muscle weakness (generalized)  Difficulty in walking, not elsewhere classified  Stiffness of right knee  Medical/Referring Diagnosis:    S/P total knee arthroplasty, right [Z96.651]    Referring Physician:  Adair Bo PA MD Orders:  PT Eval and Treat   Return MD Appt:  25   Date of Onset:  Onset Date: 25     Allergies:   Hydrocodone-acetaminophen  Restrictions/Precautions:   None      Interventions Planned (Treatment may consist of any combination of the following):     See Assessment Note    Subjective Comments:     Pt states the knee is stiff. Walking at as part of her HEP and trying to walk without her cane.     Initial Pain Level:   3 /10  Post Session Pain Level:   2   /10  Medications Last Reviewed: 2025  Updated Objective Findings:      Right knee AROM flexed at 8 -90    PROM right knee flexed at 4 degrees to 100    Right Tibiofemoral joint mobs hyp with a/p glide  Right fib head with hypomobility   Patella with hypomobility into superior glide   Spasms at right hams and quad   Right quad strength SLR= 3 reps with c/ pain at distal quad     Treatment     THERAPEUTIC EXERCISE: ( 28minutes):    Exercises per grid below to improve mobility, strength, balance, and coordination. Required

## 2025-02-19 ENCOUNTER — HOSPITAL ENCOUNTER (OUTPATIENT)
Dept: PHYSICAL THERAPY | Age: 60
Setting detail: RECURRING SERIES
End: 2025-02-19
Payer: COMMERCIAL

## 2025-02-24 ENCOUNTER — HOSPITAL ENCOUNTER (OUTPATIENT)
Dept: PHYSICAL THERAPY | Age: 60
Setting detail: RECURRING SERIES
Discharge: HOME | End: 2025-02-27
Payer: COMMERCIAL

## 2025-02-24 PROCEDURE — 97110 THERAPEUTIC EXERCISES: CPT

## 2025-02-24 PROCEDURE — 97140 MANUAL THERAPY 1/> REGIONS: CPT

## 2025-02-24 NOTE — PROGRESS NOTES
Sherri Salmeron  : 1965  Primary: [employer] (Yun [employer])  Secondary:  [facility] @ [place] [address] [place] [address]  Phone: [phone]  Fax: [phone] Plan Frequency: 2x/wk for 8 weeks  Plan of Care/Certification Expiration Date: 25        Plan of Care/Certification Expiration Date:  Plan of Care/Certification Expiration Date: 25    Frequency/Duration: Plan Frequency: 2x/wk for 8 weeks      Time In/Out:   Time In: 1730  Time Out: 1808      PT Visit Info:         Visit Count:  5    OUTPATIENT PHYSICAL THERAPY:   Treatment Note 2025       Episode  (R TKA)               Treatment Diagnosis:    S/P total knee arthroplasty, right  Muscle weakness (generalized)  Difficulty in walking, not elsewhere classified  Stiffness of right knee  Medical/Referring Diagnosis:    S/P total knee arthroplasty, right [Z96.651]    Referring Physician:  Adair Bo PA MD Orders:  PT Eval and Treat   Return MD Appt:  25   Date of Onset:  Onset Date: 25     Allergies:   Hydrocodone-acetaminophen  Restrictions/Precautions:   None      Interventions Planned (Treatment may consist of any combination of the following):     See Assessment Note    Subjective Comments:     Pt states that her right. When she is up and moving around her pain increases to a 7/10 right around the knee cap. When she sits her pain decreases with massage and ice.     Initial Pain Level:   3 /10  Post Session Pain Level:   2   /10  Medications Last Reviewed: 2025  Updated Objective Findings:      25  Right knee AROM flexed at 13 degrees   Flexion AROM 10-90      PROM right knee flexed at 4 degrees to 100  Swelling at infrapatella tendon  Right Tibiofemoral joint mobs hyp with a/p glide  Right fib head with hypomobility   Patella with hypomobility into superior glide   Spasms at right hams and quad   Right quad strength SLR= 3 reps with good quality and no extensor lag

## 2025-02-26 ENCOUNTER — HOSPITAL ENCOUNTER (OUTPATIENT)
Dept: PHYSICAL THERAPY | Age: 60
Setting detail: RECURRING SERIES
Discharge: HOME | End: 2025-03-01
Payer: COMMERCIAL

## 2025-02-26 PROCEDURE — 97140 MANUAL THERAPY 1/> REGIONS: CPT

## 2025-02-26 PROCEDURE — 97110 THERAPEUTIC EXERCISES: CPT

## 2025-02-26 PROCEDURE — 97530 THERAPEUTIC ACTIVITIES: CPT

## 2025-02-26 NOTE — PROGRESS NOTES
Sherri Salmeron  : 1965  Primary: Omer Hernandezbs (Yun HERNANDEZBS)  Secondary:  [facility] @ [place] [address] [place] [address]  Phone: [phone]  Fax: [phone] Plan Frequency: 2x/wk for 8 weeks  Plan of Care/Certification Expiration Date: 25        Plan of Care/Certification Expiration Date:  Plan of Care/Certification Expiration Date: 25    Frequency/Duration: Plan Frequency: 2x/wk for 8 weeks      Time In/Out:   Time In: 1715  Time Out: 1800      PT Visit Info:         Visit Count:  6    OUTPATIENT PHYSICAL THERAPY:   Treatment Note 2025       Episode  (R TKA)               Treatment Diagnosis:    S/P total knee arthroplasty, right  Muscle weakness (generalized)  Difficulty in walking, not elsewhere classified  Stiffness of right knee  Medical/Referring Diagnosis:    S/P total knee arthroplasty, right [Z96.651]    Referring Physician:  Adair Bo PA MD Orders:  PT Eval and Treat   Return MD Appt:  25   Date of Onset:  Onset Date: 25     Allergies:   Hydrocodone-acetaminophen  Restrictions/Precautions:   None      Interventions Planned (Treatment may consist of any combination of the following):     See Assessment Note    Subjective Comments:     Pt continues to report pain at anterior right knee. 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 Pt is perform HEP as directed. She is elevating after work, performing self massage, doing quad sets. Unable to perform SLR as she has shep pain below her knee cap    Initial Pain Level:   3 /10  Post Session Pain Level:   2   /10  Medications Last Reviewed: 2025  Updated Objective Findings:      Pt continues to have swelling at her right anterior knee which impacts her SLR initaly able to perform 5 and following manual was able to    perform 18 with verbal cues to prevent extensor lag     Treatment     THERAPEUTIC EXERCISE: ( 26 minutes):    Exercises

## 2025-03-03 ENCOUNTER — HOSPITAL ENCOUNTER (OUTPATIENT)
Dept: PHYSICAL THERAPY | Age: 60
Setting detail: RECURRING SERIES
Discharge: HOME | End: 2025-03-06
Payer: COMMERCIAL

## 2025-03-03 PROCEDURE — 97530 THERAPEUTIC ACTIVITIES: CPT

## 2025-03-03 PROCEDURE — 97110 THERAPEUTIC EXERCISES: CPT

## 2025-03-03 NOTE — PROGRESS NOTES
Salmeron will be able to reach downward toward floor to retrieve a 30 lb object without LOB in order to safely complete heavy household chores and requirements for lifting at  in kitchen. 8.  [] Date:   9. Sherri Salmeron will increase max gait speed to >=4.0 ft/sec to improve mobility in household and community and decrease fall risk.  9.  [] Date:        >Total Treatment Billable Duration:  41 minutes       Time In: 1715  Time Out: 1756     MARGARITA FUENTES, PUMA         Charge Capture  Events  [employer] Portal  Appt Desk  Attendance Report     Future Appointments   Date Time Provider Department Center   3/5/2025  5:15 PM Vanna Murillo, PT [facility]   3/10/2025  5:15 PM Vanna Murillo, PT [facility]   3/12/2025  5:15 PM Vanna Murillo, PT [facility]   3/17/2025  5:15 PM Vanna Murillo, PT [facility]   3/19/2025  5:15 PM Vanna Murillo, PT [facility]   7/7/2025  8:30 AM Ino Ocasio MD [facility]

## 2025-03-05 ENCOUNTER — APPOINTMENT (OUTPATIENT)
Dept: PHYSICAL THERAPY | Age: 60
End: 2025-03-05
Payer: COMMERCIAL

## 2025-03-10 ENCOUNTER — HOSPITAL ENCOUNTER (OUTPATIENT)
Dept: PHYSICAL THERAPY | Age: 60
Setting detail: RECURRING SERIES
End: 2025-03-10
Payer: COMMERCIAL

## 2025-03-12 ENCOUNTER — HOSPITAL ENCOUNTER (OUTPATIENT)
Dept: PHYSICAL THERAPY | Age: 60
Setting detail: RECURRING SERIES
End: 2025-03-12
Payer: COMMERCIAL

## 2025-03-17 ENCOUNTER — HOSPITAL ENCOUNTER (OUTPATIENT)
Dept: PHYSICAL THERAPY | Age: 60
Setting detail: RECURRING SERIES
Discharge: HOME | End: 2025-03-20
Payer: COMMERCIAL

## 2025-03-17 PROCEDURE — 97110 THERAPEUTIC EXERCISES: CPT

## 2025-03-17 PROCEDURE — 97530 THERAPEUTIC ACTIVITIES: CPT

## 2025-03-17 NOTE — THERAPY DISCHARGE
Sherri Salmeron  : 1965  Primary: [employer] (Yun [employer]CIADOBS)  Secondary:  [facility] @ [place] [address] [place] [address]  Phone: [phone]  Fax: [phone]             Time In/Out:   Time In: 1715  Time Out: 1759      PT Visit Info:         Visit Count:  8                OUTPATIENT PHYSICAL THERAPY:             Discharge Summary 3/17/2025               Episode (R TKA)         Treatment Diagnosis:     S/P total knee arthroplasty, right  Muscle weakness (generalized)  Difficulty in walking, not elsewhere classified  Stiffness of right knee  Medical/Referring Diagnosis:    S/P total knee arthroplasty, right    Referring Physician:  Adair Bo PA MD Orders:  PT Eval and Treat   Return MD Appt:  25  Date of Onset:  Onset Date: 25     Allergies:  Hydrocodone-acetaminophen  Restrictions/Precautions:    None      Medications Last Reviewed: 3/17/2025     SUBJECTIVE         Discharge Summary       Sherri has been seen by skilled PT for 8 visits. With PT she had met 9 out of 11 goals. Her TUG = 6.12.sec indicating she is a safe community ambulator. She improved her 5X sit to stand test to 9 sec indicating she has improved LE strength to WNL. Pt educated in HEP for walking to improve her overall fitness and endurance. She reports good understanding and is agreeable to D/C today. Pt has returned to all housework, yard work and is 100% in performing all work related duties.       GOALS:     Goals: 4 weeks     Goal Met   1. Sherri Salmeron will be able to perform 5 sit to stand transfers independently from a 18  inch high surface in <= 15 seconds to rise from chair/toilet at home and in the community.  1.  [x] Date:   2. Sherri Salmeron will increase steady state gait speed to >=2.5  ft/sec to improve mobility in household and community and decrease fall risk.  2.  [x] Date:   3. Sherri Salmeron will be able to push and pull 30

## 2025-03-17 NOTE — PROGRESS NOTES
Sherri Salmeron  : 1965  Primary: Omer Hernandezbs (Yun HERNANDEZBS)  Secondary:  [facility] @ [place] [address] [place] [address]  Phone: [phone]  Fax: [phone] Plan Frequency: 2x/wk for 8 weeks  Plan of Care/Certification Expiration Date: 25        Plan of Care/Certification Expiration Date:  Plan of Care/Certification Expiration Date: 25    Frequency/Duration: Plan Frequency: 2x/wk for 8 weeks      Time In/Out:          PT Visit Info:         Visit Count:  8    OUTPATIENT PHYSICAL THERAPY:   Treatment Note 3/17/2025       Episode  (R TKA)               Treatment Diagnosis:    S/P total knee arthroplasty, right  Muscle weakness (generalized)  Difficulty in walking, not elsewhere classified  Stiffness of right knee  Medical/Referring Diagnosis:    S/P total knee arthroplasty, right [Z96.651]    Referring Physician:  Adiar Bo PA MD Orders:  PT Eval and Treat   Return MD Appt:  25   Date of Onset:  Onset Date: 25     Allergies:   Hydrocodone-acetaminophen  Restrictions/Precautions:   None      Interventions Planned (Treatment may consist of any combination of the following):     See Assessment Note    Subjective Comments:     Pt continues to report pain at anterior right knee. 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 Pt is perform HEP as directed. She is elevating after work, performing self massage, doing quad sets. Unable to perform SLR as she has shep pain below her knee cap    Initial Pain Level:  1 /10  Post Session Pain Level:   0  /10  Medications Last Reviewed: 3/17/2025  Updated Objective Findings:        TUG= 6.12 sec    1.97 sec 10 meter walk test     6 min  611ft in 2.27 min       Treatment     THERAPEUTIC EXERCISE: ( 26 minutes):    Exercises per grid below to improve mobility, strength, balance, and coordination. Required minimal visual, verbal, manual, and tactile cues to promote

## 2025-03-19 ENCOUNTER — APPOINTMENT (OUTPATIENT)
Dept: PHYSICAL THERAPY | Age: 60
End: 2025-03-19
Payer: COMMERCIAL

## (undated) DEVICE — SUTURE VICRYL + SZ 1-0 L36IN ABSRB UD CTX 1/2 CIR TAPR PNT VCP977H

## (undated) DEVICE — SOLUTION IRRIG 1000ML STRL H2O USP PLAS POUR BTL

## (undated) DEVICE — SUTURE VICRYL SZ 1 L36IN ABSRB UD CTX L48MM 1/2 CIR J977H

## (undated) DEVICE — NEEDLE HYPO 21GA L1.5IN INTRAMUSCULAR S STL LATCH BVL UP

## (undated) DEVICE — PIN BNE FIX TEMP L110MM DIA4MM MAKO

## (undated) DEVICE — STERILE PVP: Brand: MEDLINE INDUSTRIES, INC.

## (undated) DEVICE — SOLUTION IRRIG 1000ML 0.9% SOD CHL USP POUR PLAS BTL

## (undated) DEVICE — KENDALL RADIOLUCENT FOAM MONITORING ELECTRODE RECTANGULAR SHAPE: Brand: KENDALL

## (undated) DEVICE — SOLUTION IV 250ML 0.9% SOD CHL PH 5 INJ USP VIAFLX PLAS

## (undated) DEVICE — GLOVE SURG SZ 7 L12IN FNGR THK79MIL GRN LTX FREE

## (undated) DEVICE — KIT INT FIX FEM TIB CKPT MAKOPLASTY

## (undated) DEVICE — NDL PRT INJ NSAF BLNT 18GX1.5 --

## (undated) DEVICE — PIN BNE FIX TEMP L140MM DIA4MM MAKO

## (undated) DEVICE — KIT DRP FOR RIO ROBOTIC ARM ASST SYS

## (undated) DEVICE — BLOCK BITE AD 60FR W/ VELC STRP ADDRESSES MOST PT AND

## (undated) DEVICE — HOOD: Brand: T7PLUS

## (undated) DEVICE — KIT TRK KNEE PROC VIZADISC

## (undated) DEVICE — SYRINGE MED 10ML LUERLOCK TIP W/O SFTY DISP

## (undated) DEVICE — SOLUTION IRRIG 3000ML 0.9% SOD CHL USP UROMATIC PLAS CONT

## (undated) DEVICE — STERILE PRESSURE PROTECTOR PAD® FOR DE MAYO UNIVERSAL DISTRACTOR® (10/CASE): Brand: DE MAYO UNIVERSAL DISTRACTOR®

## (undated) DEVICE — CANNULA NSL ORAL AD FOR CAPNOFLEX CO2 O2 AIRLFE

## (undated) DEVICE — BLADE SURG SAW STD S STL OSC W/ SERR EDGE DISP

## (undated) DEVICE — SYR 5ML 1/5 GRAD LL NSAF LF --

## (undated) DEVICE — DUAL CUT SAGITTAL BLADE

## (undated) DEVICE — GLOVE SURG SZ 9 THK91MIL LTX FREE SYN POLYISOPRENE ANTI

## (undated) DEVICE — SUTURE ABS ANTIBACT 1-0 CTX 24IN STRATAFIX PDS+ SXPP1A445

## (undated) DEVICE — SUTURE MONOCRYL STRATAFIX SPRL + SZ 2-0 L18IN ABSRB UD CT-1 SXMP1B413

## (undated) DEVICE — CONNECTOR TBNG OD5-7MM O2 END DISP

## (undated) DEVICE — SUTURE MONOCRYL SZ 2-0 L27IN ABSRB UD CP-1 1 L36MM 1/2 CIR REV Y266H

## (undated) DEVICE — SYR 3ML LL TIP 1/10ML GRAD --

## (undated) DEVICE — GLOVE ORANGE PI 7 1/2   MSG9075

## (undated) DEVICE — BIPOLAR SEALER 23-112-1 AQM 6.0: Brand: AQUAMANTYS ®

## (undated) DEVICE — CONTAINER PREFIL FRMLN 40ML --

## (undated) DEVICE — GLOVE SURG SZ 8 L12IN FNGR THK79MIL GRN LTX FREE

## (undated) DEVICE — FORCEPS BX L240CM JAW DIA2.8MM L CAP W/ NDL MIC MESH TOOTH

## (undated) DEVICE — GLOVE SURG SZ 65 THK91MIL LTX FREE SYN POLYISOPRENE

## (undated) DEVICE — TOTAL KNEE: Brand: MEDLINE INDUSTRIES, INC.